# Patient Record
Sex: MALE | Race: WHITE | NOT HISPANIC OR LATINO | Employment: FULL TIME | URBAN - METROPOLITAN AREA
[De-identification: names, ages, dates, MRNs, and addresses within clinical notes are randomized per-mention and may not be internally consistent; named-entity substitution may affect disease eponyms.]

---

## 2019-01-16 ENCOUNTER — TRANSFERRED RECORDS (OUTPATIENT)
Dept: HEALTH INFORMATION MANAGEMENT | Facility: CLINIC | Age: 19
End: 2019-01-16

## 2019-02-15 ENCOUNTER — TELEPHONE (OUTPATIENT)
Dept: HEMATOLOGY | Facility: CLINIC | Age: 19
End: 2019-02-15

## 2019-02-15 NOTE — TELEPHONE ENCOUNTER
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      I reviewed our location and the recommended parking options. He had received our packet of instructions.    I did outline the plan for the visit and the providers that he would see.  I told him that I anticipate that the visit would take ~ 3 hours.    I presented the details of the [employer] registry, which he could participate in.  He said that he would like to participate.    I did ask him to provide any specific concerns that he wants to address at this visit.  He said he did not have any.    He plans to attend the visit as scheduled.  I wished him well and told him that we looked forward to seeing him at his visit. I told him that Caitlyn would be the nurse seeing him that day.    Nany Rahman, RN - Nurse Clinician - [facility] - [phone]

## 2019-02-18 ENCOUNTER — OFFICE VISIT (OUTPATIENT)
Dept: HEMATOLOGY | Facility: CLINIC | Age: 19
End: 2019-02-18

## 2019-02-18 ENCOUNTER — TRANSFERRED RECORDS (OUTPATIENT)
Dept: HEALTH INFORMATION MANAGEMENT | Facility: CLINIC | Age: 19
End: 2019-02-18

## 2019-02-18 ENCOUNTER — OFFICE VISIT (OUTPATIENT)
Dept: HEMATOLOGY | Facility: CLINIC | Age: 19
End: 2019-02-18
Attending: GENETIC COUNSELOR, MS
Payer: COMMERCIAL

## 2019-02-18 ENCOUNTER — OFFICE VISIT (OUTPATIENT)
Dept: HEMATOLOGY | Facility: CLINIC | Age: 19
End: 2019-02-18
Attending: INTERNAL MEDICINE
Payer: COMMERCIAL

## 2019-02-18 ENCOUNTER — HOSPITAL ENCOUNTER (OUTPATIENT)
Dept: PHYSICAL THERAPY | Facility: CLINIC | Age: 19
Setting detail: THERAPIES SERIES
End: 2019-02-18
Attending: PHYSICAL THERAPIST
Payer: COMMERCIAL

## 2019-02-18 VITALS
OXYGEN SATURATION: 98 % | SYSTOLIC BLOOD PRESSURE: 138 MMHG | HEART RATE: 62 BPM | DIASTOLIC BLOOD PRESSURE: 70 MMHG | WEIGHT: 168.6 LBS | BODY MASS INDEX: 23.6 KG/M2 | RESPIRATION RATE: 12 BRPM | TEMPERATURE: 97.7 F | HEIGHT: 71 IN

## 2019-02-18 DIAGNOSIS — D66 HEMOPHILIA A (H): Primary | ICD-10-CM

## 2019-02-18 DIAGNOSIS — R26.9 ABNORMAL GAIT: ICD-10-CM

## 2019-02-18 DIAGNOSIS — D66 HEMOPHILIC ARTHROPATHY (H): ICD-10-CM

## 2019-02-18 DIAGNOSIS — M36.2 HEMOPHILIC ARTHROPATHY (H): ICD-10-CM

## 2019-02-18 DIAGNOSIS — Z71.9 ENCOUNTER FOR COUNSELING: Primary | ICD-10-CM

## 2019-02-18 LAB
BETHESDA FOR FACTOR 8: 0.1
HCV AB SERPL QL IA: NEGATIVE
HIV 1+2 AB+HIV1 P24 AG SERPL QL IA: NORMAL

## 2019-02-18 PROCEDURE — 99213 OFFICE O/P EST LOW 20 MIN: CPT | Performed by: INTERNAL MEDICINE

## 2019-02-18 PROCEDURE — 85240 CLOT FACTOR VIII AHG 1 STAGE: CPT | Performed by: PHYSICIAN ASSISTANT

## 2019-02-18 PROCEDURE — 97161 PT EVAL LOW COMPLEX 20 MIN: CPT | Mod: GP | Performed by: PHYSICAL THERAPIST

## 2019-02-18 PROCEDURE — 97110 THERAPEUTIC EXERCISES: CPT | Mod: GP | Performed by: PHYSICAL THERAPIST

## 2019-02-18 PROCEDURE — 00000167 ZZHCL STATISTIC INR NC: Performed by: PHYSICIAN ASSISTANT

## 2019-02-18 PROCEDURE — 00000401 ZZHCL STATISTIC THROMBIN TIME NC: Performed by: PHYSICIAN ASSISTANT

## 2019-02-18 PROCEDURE — 00000328 ZZHCL STATISTIC PTT NC: Performed by: PHYSICIAN ASSISTANT

## 2019-02-18 RX ORDER — FEXOFENADINE HCL 60 MG/1
60 TABLET, FILM COATED ORAL PRN
COMMUNITY

## 2019-02-18 RX ORDER — ANTIHEMOPHILIC FACTOR (RECOMBINANT) 3000 (+/-)
3000 KIT INTRAVENOUS PRN
Refills: 0 | Status: ON HOLD | COMMUNITY
Start: 2018-08-17 | End: 2021-09-03

## 2019-02-18 ASSESSMENT — PAIN SCALES - GENERAL: PAINLEVEL: MILD PAIN (2)

## 2019-02-18 ASSESSMENT — MIFFLIN-ST. JEOR: SCORE: 1796.89

## 2019-02-18 NOTE — PATIENT INSTRUCTIONS
Reason for Visit:  Hemophilia Comprehensive Evaluation  Attended entire visit with Dr. Osei, CORY Calderón  Face To Face Time for Education (After provider visit): 20 minutes     Teaching Flowsheet   Clyde Becerra  has a diagnosis of HEMOPHILIA A  with a baseline factor  FACTOR VIII of   2% . He  presents today for his  comprehensive Hemophilia clinic evaluation.  Teaching Topic: Establish care with our center, update emergency treatment rec     Person(s) involved in teaching:   Patient     Motivation Level:  Asks Questions: Yes  Eager to Learn: Yes  Cooperative: Yes  Receptive (willing/able to accept information): Yes     Patient demonstrates understanding of the following:  Reason for the appointment, diagnosis and treatment plan: Yes  Knowledge of proper use of medications and conditions for which they are ordered (with special attention to potential side effects or drug interactions): Yes  Which situations necessitate calling provider and whom to contact: Yes      Nutritional needs and diet plan: NA  Pain management techniques: Yes  Wound Care: NA  How and/when to access community resources: Yes        Clyde Becerra   averages 0 -1 Bleeding Episodes per Month . Annual Bleed Rate (ABR)= 0-1.  He uses advate brand factor VIII concentrate and treats on demand.  He usually comes to clinic or hospital for infusion but has expressed desire to self infuse in future and his typical dose   Is 3000  Units (40 units/kg of Jivi).  He is encouraged to keep track of his infusions and was given information on ADVOY.       - Reviewed procedure for home infusions of factor concentrates.  See attached treatment recommendations.    - Reviewed severe/life threatening hemorrhage and handout given that recommends appropriate dosing.  If suspect bleeding in head, neck, throat or abdomen, give dose of factor if able, contact the hemophilia center immediately or report to the Emergency Room at [facility] or  the nearest emergency room.    - Reviewed standard precautions to prevent viral transmission in the home.    - Discussed importance of Medic Alert identification.  Medic Alert application given to patient.    - Discussed need for dental check-ups and prophylactic antibiotics for dental procedures.   - Patient exercises 1-2x/wk.  Activities he enjoys include: boot hockey with friends.  -Patient's primary provider is Dr. Sheets at [facility].    Discussed and gave copies of the following handouts with patient.  [facility] Contact Card    PHYSICAL THERAPY INSTRUCTIONS:    You were given instructions in calf and hamstring muscles today.  Try to do these at least a few times/week up to daily.  Hold all stretches for 30 seconds.  Make sure to walk around and be active after stretching.    You were instructed in sitting with your legs in a comfortable but not fully bent position when you are relaxing, playing video games, to keep your legs stretched out.    We talked about increasing your daily activity especially during the winter months by trying to find indoor activities.    You were given instructions in recognizing a new bleed into your joints.  If you ever have questions or concerns you should call your hemophilia team.    Contact Carol Ann physical therapist, with any joint, muscle or mobility questions or concerns.  Treatment of New Joint and/or Muscle Bleeds:     Treat with factor per doctor s orders.    Apply RICE treatment as needed for pain relief and to allow rest and healing   o R=Rest, Limit movement and protect your joint with splints, braces and assistive devices as directed by your treatment center.  Use crutches and do not put any weight on a new lower extremity bleed (ex: hip, knee, ankle).   Only move in pain-free range.  o I=Ice, Apply ice to surround the affected area for 15-20 minutes every 2 hours.  Use ice cubes in bag, frozen vegetables, gel ice  packs, or commercial products (Cryocuff ).  o C=Compression, Helps to control swelling and can decrease pain. Can use elastic wraps or compression sleeve such as tubigrip.  Avoid tourniquet effect by using proper technique. Remove if pain increases or with any numbness or tingling.   o E=Elevate, Can help decrease swelling and encourages rest.  Most effective with elevation above level of the heart.  Joint Health: Bleed prevention and regular activity are key elements to maintaining health joints. Limit activities that cause joint pain and spread out activities/exercises throughout the day and the week, alternate exercises each day.    Footwear: Always wear supportive footwear-shoes with laces and good ankle support are best.  If you experience frequent foot/ankle pain, try wearing shoes indoors as well as outdoors.    Cardiovascular activity recommendations (18 years and over): For health benefits, adults need at least 150 minutes (about 30 minutes, 5 days a week) of moderate intensity aerobic activity each week AND 2 or more days of muscle strengthening to include all major muscle groups.  Also include a general stretching program into your daily routine.  Try breaking up your aerobic activity into small chunks of time during the day,  10 minutes at a time.      Hailey Hall [facility]  Physical Therapist  [facility]  [phone]

## 2019-02-18 NOTE — PROGRESS NOTES
Comprehensive Hemophilia Clinic Visit   [facility]  [address] [place] [id number]  Phone: [phone], Fax: [phone]    Patient: Clyde Becerra  MRN: [id number]  : 2000  SLIM: 2019    Pertinent Hemophilia History:  Clyde is a 18 year old male with moderate hemophilia A (baseline levels reported as 1-2%) without history of inhibitor.  Here for his first adult hemophilia clinic visit.      Diagnosed at about 16 months of age when he was evaluated for bleeding from tongue.  There was no family history of hemophilia, no bleeding with his circumcision.  Some increased bruising following immunizations.  Born via elective  due to being large for gestational age. Following birth he had slow development and macrocephaly.  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  Diagnosed with ADHD 2015 by psychologist.     First joint bleed: 2000 - right ankle.  This was date of first treatment as well.     Had a DDAVP challenge in  which increased his factor VIII to 5%.    In 2008 he was started on factor prophylaxis for left knee pain and MRI which showed mild synovitis.  Due to significant needle phobia he ended up having a port a cath placed in 2009.  This was removed in 2014. He was diagnosed in  by orthopedics with Ignrkae-Ileodp-Oajkcovis syndrome of the left knee and prophylaxis was stopped as pain was not felt to be from bleeding.  Follow-up MRI of knee in 2011 showed stable mild synovitis of the left knee.  He reports no recurrent bleeding events into the knee.     Clyde has had very few bleeding events in the last few years and has not been on prophylactic factor.  He has been seen in clinic for all factor administrations.      He reports today concern for vomiting after alcohol consumption, he cannot predict when it will happen but always occurs the morning after drinking and it happens for many hours.  He  has noticed some blood in his vomit at times.  He has had no other bleeding issues (nosebleeds, gum bleeding, no blood in stool or urine).  He denies reflux symptoms and takes no other medications.     He feels he is otherwise healthy.  He does note significant anxiety and uses marijuana daily for this.     2. Treatment Regimen:     Prophylaxis (Y/N): No   Factor, dose, schedule: Has use Advate in the past     On Demand for bleeds:   Factor, dose: Advate 3000 units = 40 units/kg     Home therapy?:Has factor at home but ability to treat is minimal     Central Venous Access Device: No, he had a port 3951-2405    3. Previous joint/muscle disease, procedures, and other events:  Left knee with Uidhxge-Lzocik-Oxpiuyifs syndrome. Last evaluation in 2011.  Mild synovitis noted at that time.   No other joints of concern.    4.  Infectious complications   Hepatitis A vaccines: vaccinated    HIV Status: negative   HCV Status and treatment: negative   HBV Status/vaccinations: vaccinated     Evidence of cirrhosis or liver disease on imaging? (Y/N/unkown): no concern based on history     Visit History:  1. Total # bleeding events since last comprehensive hemophilia clinic visit requiring factor infusion: none, he has been treated for possible bleeds twice in the last year or so but neither we clearly bleeds on imaging.    2. Method of recalling bleeding events (memory, paper log, ruth etc): None    3.  Hemarthroses: None    4. Other Bleeds (GI,, Soft tissue etc) and spontaneous vs traumatic: None    5. Joint Disease and Pain:  Joint procedures (synovectory or joints replacement) since last comprehensive visit: None  Overall activity level:   Work/school, recreation, and self care is: unrestricted    Chronic pain?: No chronic pain  Prescribed opioids for chronic pain and taking how often: No chronic opioids    6.  Hospitalizations and Absenteeism   # ER visits since last comprehensive visit: None   Hospitalizations since last  "comprehensive visit (not surgery): None   How many days of work/school missed since last comprehensive visit: None    7. Health maintenance:   PCP: has a primary care provider and was seen in 1/2019 for vomiting issues.  Is on no other medications, specifically for anxiety or ADHD.    Medications:  No current outpatient medications on file.     No current facility-administered medications for this visit.        Allergies:  Allergies not on file    PmHx:  No past medical history on file.    Family History:  Please refer to Paulina Broosk [facility]'s note for details.  No known family history of hemophilia.    Social History:  Please see Nellie MARTINEZ's note for her evaluation and assessment.     ROS:  Complete ROS is negative, except as noted above.     Objective:  Vitals: /70 (BP Location: Right arm, Patient Position: Sitting, Cuff Size: Adult Regular)   Pulse 62   Temp 97.7  F (36.5  C) (Oral)   Resp 12   Ht 1.803 m (5' 11\")   Wt 76.5 kg (168 lb 9.6 oz)   SpO2 98%   BMI 23.51 kg/m    Exam:  General: No acute distress.   Ext: Please see Carol Ann ESPANA's note for joint evaluation.       Labs:  Factor VIII assay to document baseline  CDC registry labs    Imaging:  None ordered.  Reviewed MRI of left knee done 2011 which showed stable mild synovitis.      Assessment:  In summary, Clyde Becerra is a 18 year old year old man with moderate hemophilia A (baseline factor VIII documented as 1-2%) here for initial adult hemophilia clinic visit.  Overall Clyde is doing well from a joint bleeding perspective, joints are preserved and he has few bleeding episodes. His and our main concern is his frequent vomiting episodes after using alcohol.  Discussed at length how best to avoid this and the risk for bleeding this imposes.      Clyde is going on a trip to [place] with friends in the next month and we discussed a trial of extended half life factor VIII for prevention of bleeds for this visit.  He is interested in this " option.    Plan:  1. Trial of Jivi (pegylated factor VIII) was written for today (8 doses of 3000 units = 40 units/kg).  Plan for two doses (3/1 and 3/6) before travel to [place] on 3/7 with factor VIII level being drawn on 3/6 before second dose.  This is not ideal but given the short time frame, but will give us some information.  He will come to clinic for these doses as he is not comfortable with self-infusion.  He will bring doses of factor to [place] with him for emergency use.  Upon return we would plan to obtain further factor levels after use to determine his pharmacokinetics on Jivi.  2. Would like to see Clyde in 6 months for routine follow-up.  No PT needed for this visit.   3. Will continue to work with Clyde on his level of comfort with self infusion and also ordering factor etc.    4. Discussed concern with his alcohol use and asked him to call with any persistent blood in his vomit.     Carol Ann Hall MPT; Nellie Gonzáles LICSW; Paulina Brooks [facility]; Sagar Nguyen, pharmacist and,Caitlyn Zhang nurse clinician have all seen the patient independently, please refer to their notes for their evaluation and assessment.           Eboni Tamayo MPH, PA-C  [facility]  [facility]  [phone] main line  [phone] pager  [phone] fax      HEMATOLOGY STAFF    Patient seen and evaluated as part of a shared visit.  I have reviewed the clinical history, physical exam, relevant labs, and treatment plan with the TONI, as well as other members of the comprehensive hemophilia care team.      Key findings:    19 yo male with moderate hemophilia A (baseline FVIII 1-2%), here to establish care.  Previously followed at [facility].  Treats on demand due to non-severe bleeding phenotype, preserved joints, and severe needle phobia.  Issues addressed today included discussion of FVIII prophylaxis for upcoming trip to [place], and his episodes of emesis following alcohol  consumption.    Key management decisions:    1.  Counseled about the dangers of alcohol consumption and subsequent recurrent emesis.  Encouraged to minimize alcohol use, and seek medical care promptly for bloody emesis.  2.  We will initiate a trial of extended half-life FVIII (Jivi) to offer some protection for his upcoming trip to [place].  This particular drug selected due to it's superior PK profile compared to other [facility] FVIII products (recent data presented at [employer] congress).  Will obtain FVIII levels for PK testing as outlined above.  Although we do not plan to keep him on prophylaxis, this information will be helpful to guide treatment for future bleeding episodes, or invasive procedures.      Pete Osei MD  Associate Professor of Medicine  Division of Hematology, Oncology, and Transplantation  Director, [facility]

## 2019-02-18 NOTE — PROGRESS NOTES
[facility]  [address] [place] [address] [address], [place] [address]  Main: [phone], Fax: [phone]    Social Work Evaluation    Name:  Clyde Becerra  Age:   18 year old  :  2000    Presenting Information:  Patient is a patient diagnosed with moderate hemophilia A with baseline factor VIII level of ~2% who presented to clinic today to establish care at an adult center and for a comprehensive clinic evaluation.   Met with Clyde 1:1 to complete psychosocial evaluation.  Collaborated with AY[facility] at [facility].    Living Situation:   Clyde lives in a home in [place] with his mom and dad.  He has had a tense relationship with his parents over time, and has experienced periods of homelessness when they would not allow him to stay at home.  He stated for now, staying wit his parents is a stable option. He would like to get a place of his own, but does not have active plans to locate housing at this time.    Family/Social Support:  In addition to Clyde's parents Chad and Jonnathan, he has an older sister (age 23) who lives in [place].  He stated he has a small and stable friend group that he hangs out with regularly.    Functional Status: Independent with ADLs and IADLs, including active motor vehicle .    Current Community Services:  None reported.  Clyde was not sure if he is connected with  assistance.  Past Community Services: Not discussed today.    Chemical Dependency:    Clyde stated he occasionally uses a e-cigarette, drinks alcohol 1-2x/week (usually weekends) and will have anywhere from 1-10 drinks at a time, and uses marijuana daily.    He declined information on cutting down use.  He is aware of laws related to legal age of use.  He denied psychosocial impact related to use, and high-risk behaviors related to use (ie doesn't drive with friends who have been drinking, uses taxis to get to/from places).  He has experienced ongoing nausa/severe  "vomiting after nights of drinking for which he has seen his PCP and discussed with providers here.  He stated marijuana helps him with pain, sleep, and stress management.    Mental/Emotional Health:   The patient reported a history of the following mental health concerns and/or diagnoses: ADHD diagnosis and self-reported anxiety and anger-issues.    PHQ-2: 0.    Clyde openly shared his long history of anger issues, and how this has manifested for him and impacted his life. He feels he has taken big steps towards management, stating he often uses self-talk or distraction to calm his emotions.  Clyde stated he used to varela a lot more often then he does now, and losing money makes him very angry, so he attributes that to his less frequent anger outbursts.  He stated he has, at times, punched holes in the wall. He has not hurt another person when angry.    Clyde stated a few weeks ago he threw away some of his factor medication.  Having hemophilia makes him angry and he \"doesn't want to deal\" with being told all his life that he \"couldn't play hockey or snowboard.\"  Provided validation and support, and dicussed consequences that may occur as a result of unresolved grief or anger, and how it may present in unhealthy coping skills.  He has [facility] a counselor at [facility] and he does not wish to pursue counseling services again at this time.    On average, Clyde sleeps 3-4 hours a night.  He has several hours a day of screen time between [employer], TV and cell phone.  Discussed sleep hygiene.  He stated he recently downloaded the [employer] Evonne (an  stated on TV it helped him with sleep) and that he has tried the free insomnia exercises and he has found them helpful.    Pain Management Strategies:   Clyde states he uses self-talk, reframing, and marijuana to manage his pain.  He smokes at least once a day. He stated he will often remind himself that \"pain is temporary.\"    Abuse Concerns:  No concerns " indicated during today's visit.    Education:  Currently attending [employer] and enrolled in 8 credits this semester.  Hemophilia Scholarships:  None.  He stated his mom is financing his college so he didn't feel the need to apply.  Encouraged him to consider applying for next semester.  Office of Disability/Accomodations:  Not registered and he was encouraged to do so  School Attendance:  Clyde stated he has not missed any school related to his bleeding disorder    Employment: Clyde last worked in November 2018 at the [employer]. He had been employed there for 4 years in . He quit to pursue his college courses and is not working at this time.    Clyde has missed one day of work in the past year due to hemophilia.  He had an elbow bleed last summer and took a day to rest his elbow.    Financial/Income:  Between savings (he reported having one year of living expenses saved) and parental support, he did not have financial concerns today. He stated that he was taught budgeting skills in high school and that he does not need support in this area.    Health Insurance:  [employer] of [place], thorough his father.  Asked him what he knew about the [place] and he stated he is aware he can stay on his dad's plan until age 24. Encouraged him to continue to familiarize himself with insurance terms as eventually, he will have his own plan and would benefit from knowing how to manage his benefits/expenses.    Health Literacy/Adjustment to Illness:  Clyde openly shared that he is angry about having hemophilia. He is not interested in counseling support to assist with this.  He has stated he would like to learn self-infusion skills.  He did schedule his own appointment at our center, but his mom primarily orders factor. He stated he would like to gain independence in this area, too.  He treats with [facility] as needed, typically in-center.    Medicalert:  Clyde does wear a Medicalert bracelet.  He stated his account is up to  date.    Advanced Care Planning:  No [facility]    Authorization to discuss PHI:  Completed this date and sent to HIM for scanning.  Electronic Consent for email: Completed this date and sent to HIM for scanning.  Electronic Consent for text messaging:  Completed this date and sent to HIM for scanning.    Interventions Utilized:   Assessment of strengths and needs  Assessment of impact of bleeding disorder, overall adjustment, and current coping skills  Supportive Counseling  Explored aspects of family history and dynamics   Explored and processed emotional/social responses to bleeding disorder and treatment regime.  Normalized and validated feelings and experiences  Provided positive feedback and encouragement  Discussed Advanced Care Planning and completed ROIs  Discussed Applicable Community Resources  Discussed transition-related topics: insurance psychoeducation, ordering factor, budgeting  Case Coordination with [facility] team and [facility] .    Impressions/Recommendations:    Clyde Becerra is a 18 year old male who [facility] this date to establish adult hemophilia care and for his routine comprehensive clinic evaluation.  Clyde presented today as alert, oriented and engaged. They were receptive to social work visit.      Clyde is working through steps to attain independence as a young adult.  He has made his own appointments and would like to gain skills around self-infusion and ordering factor.    Clyde has a history of ADHD and self-reported anger issues and anxiety. He endorses anger and denial related to his hemophilia diagnosis but is not ready for support in processing this.     He uses alcohol regularly, and engages in binge drinking behavior. He is aware of legal and social consequences of consumption and does not endorse high risk behavior surrounding use. He was encouraged to abstain due to age and health impacts, especially given the  "vomiting he has been experiencing after drinking, which was discussed by providers.  He smokes marijuana daily which he states helps him with pain, sleep, and relaxation.  He declined resources for cutting back.    Clyde described a friend group he spends a lot of time with, and a tense relationship with his parents. He enjoys going to the family cabin in the summer, playing boot hockey and video games. He is studying his generals at [employer] and would like a career in [employer] where he can travel for work.  He is travelling to [place] on March 7th with friends for a Spring Break trip.    Psychosocial stressors were identified as family of origin issues, health issues, mental health symptoms and parent-child stress  Explored Clyde's goal(s) to include \"getting a clean bill of health\" today, given recent vomiting after drinking and upcoming trip.  Additionally, he would like to gain confidence in self-infusion and ordering factor, though he states that may be hard for his mom to give up.    Plan:   There were no further SW questions at the conclusion of today's visit.  Writer will remain available to assist with psychosocial needs or concerns as they arise.  Contact information was provided and the patient was encouraged to call as needed.    Nellie Gonzáles, ULICES, LICSW, ACM  Clinical   [facility]  Phone: [phone]    "

## 2019-02-18 NOTE — PROGRESS NOTES
[facility]  [address] [place] [address] [address], [place] [address]     Bleeding Disorder Emergency Treatment Recommendation     Date:2019  Name: Clyde Becerra  MRN: [id number]  : 2000  Diagnosis:  Moderate Hemophilia A  Baseline factor level:  Factor  VIII = 2 %  Inhibitor status:    History of inhibitor? No  Most Recent [place]: In process  Patient weight:   Wt Readings from Last 1 Encounters:   19 76.5 kg (168 lb 9.6 oz)       Current treatment:  On demand Jivi 40 units/kg (3000 units as of 19)  For bleeding events:      Treat with: recombinant factor VIII 50 units/kg     Factor replacement should be administered emergently and prior to imaging or any invasive/surgical procedure     For suspected intracranial hemorrhage or life / limb threatening bleed, draw a peak factor VIII level 5-10 minutes after administration     For mucosal bleeding, antifibrinolytics may be useful; these are contraindicated in hematuria  o Aminocaproic acid (Amicar) 50mg/kg IV or PO q 6 hours  o Tranexamic acid (Lysteda) 1300 mg po TID or 10mg/kg IV q 8 hours     A single dose of factor replacement will temporarily correct the bleeding disorder  Further dosing may be required    For guidance, please contact:  [facility]: Main: [phone], Fax: [phone]  Outside providers call Physician Access at:  [phone] (ask for hematologist on call)  : Dr. Pete Osei

## 2019-02-18 NOTE — PROGRESS NOTES
2/18/2019    Presenting Information: Clyde Becerra was seen for a comprehensive clinic visit at the [facility] today. I met with him to update his family history and to review the inheritance and genetics of hemophilia A.     Personal History:   Clyde is a 18 year old year old young man with moderate hemophilia A. Please see Eboni Tamayo PA-C s note for details regarding his medical history.     Family History: A three generation pedigree, specific to bleeding was updated today from a pedigree initially obtained in 1998 and scanned into the EMR. The following information is significant:     Clyde believes that his sister recently had genetic testing that was negative, and is not a hemophilia A carrier.     He is not sure if his mom had genetic testing.    No other family history of hemophilia.    No paternal family history of bleeding disorders.     Discussion:   We reviewed that mutations in the F8 gene that cause hemophilia are inherited in an X linked recessive pattern.  The F8 gene is located on the X chromosome.  Men with hemophilia will always pass on their X chromosome to each daughter and his Y chromosome to each son.  This means that all of Clyde's future daughters will be carriers of hemophilia A, but none of his sons will have hemophilia A, unless his partner is a carrier or there is a de shahid (new) mutation in one of his children.  We discussed that other family members may be carriers of hemophilia A.  He did know that carriers had a 50/50 chance for their sons to have hemophilia.     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 I also asked Clyde if he had participated in the My Life Our Future study, but he does not think this sounds familiar. He signed a release of information form so that we may obtain his genetics records [facility].     Plan:   1. A three generation pedigree was updated and scanned into the  EMR.  2. [facility] will [facility]  3. He will meet with other members of our team today. Please see their notes independently.   4. Contact information was provided today. Additional questions or concerns were denied.    Approximate Time Spent in Consultation: 10 minutes.     Paulina Brooks MS, [facility]  Licensed Genetic Counselor  P. [phone]  F. [phone]    CC: No Letter     4/22/2019    [facility]: genetic counseling notes, pedigree, genetic test report. 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     Paulina Brooks MS, [facility]  Licensed Genetic Counselor  P. [phone]  F. [phone]

## 2019-02-19 ENCOUNTER — DOCUMENTATION ONLY (OUTPATIENT)
Dept: HEMATOLOGY | Facility: CLINIC | Age: 19
End: 2019-02-19

## 2019-02-19 LAB — FACT VIII ACT/NOR PPP: 2 % (ref 55–200)

## 2019-02-19 NOTE — PROGRESS NOTES
OUTPATIENT PHYSICAL THERAPY HEMOPHILIA CLINIC NOTE  [facility]    Clyde Becerra     YOB: 2000  [id number]    Reason for visit: Comprehensive Clinic  Date of service:  2/18/19  Referring provider: Eboni Tamayo PA-C  Medical Diagnosis: Factor VIII -  Moderate  Replacement therapy: On Demand    Interval History (include personal factors and/or comorbidities that impact the plan of care):   Clyde is new to our clinic and is here for his transition to adult care visit.  Today he reports no specific joint or muscle concerns but he does report that he has very tight hamstrings and he notices knee stiffness because of this.  Bleeds: None reported in the past year, he does not keep written records  Work/school: Clyde is currently taking classes at [employer] with interest in hospitality.  He is not currently employed.    Exercise/physical activity: Clyde reports that he really enjoys being of the water with friends, fishing and some water sports but he has not found similar interests during winter months.  He did play a little basketball and boot hockey but has not done this for about 2 months.  He activity level is fairly sedentary.       Orthopedic past medical history: None per patient    Pain:  Chief complaint: Denied joint and muscle pain    Fall Risk Screen:  Has the patient fallen 2 or more times in the last year? No  Has the patient fallen and had an injury in the past year? No  Timed Up and Go Score: NA  Is the patient a fall risk? No    Physical Exam:   ROM: WFL for age  Swelling: Right knee appears visually larger than left, palpation reveals very mild swelling.  Clyde states his right knee has had this appearance for many years and may be related to baseball sliding on this knee.   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  He is able to tolerate sitting for extended periods of time but does notice tightness in his knees upon standing.   Bleeds: None reported.  Activity: Clyde has been very sedentary for the past 2-3 months per his report.  He does not have a regular exercise program.  Ultrasound: assessment completed of bilateral knees, suprapatellar LAX and SAX, medial and lateral condyle views and infrapatellar LAX all completed.  No identified areas of concern.  Joint concerns: None at this time. 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  This was re-evaluated in 2011 and found to be resolved.  He denies ongoing concerns in the left knee.   Educational needs: Clyde denies ability to recognize acute bleeding and does not verbalize s/s.  Clyde states he has HEP to stretch his hamstrings but is unable to demonstrate these stretches today.  Clyde spends a lot of time in sitting both at school and playing video games, he is not able to demonstrate postural corrections or options.    Findings affecting muscle length, posture and general knowledge on joint health.     Treatment diagnosis: Limited knowledge of condition and / or self care - inability to control symptoms, Impaired posture / muscle imbalance, Muscle flexibility limitation    Clinical Presentation: Stable/Uncomplicated  Clinical Presentation Rationale: Expected presentation for age  Clinical Decision Making (Complexity): Low complexity  Treatment: Met with patient 1:1 today to update musculoskeletal and mobility history and provide education for home management of bleeding disorder.  Clyde is appropriate for hemophilia specific skilled PT services today to address home management of his bleeding disorder.  Treatment included:     Performed and provided verbal and written instruction in  posterior leg muscle stretches, provided variety of options to alternate stretches.  Instructed to complete minimum of every other day with goal of daily stretching.  Instructed to remain upright and active immediately following completion of stretching program.      Provided education on how positioning and posture can affect muscle length.  Encouraged sitting with knees are extended as possible when he is playing video games or other prolonged seated activity.      Educated on importance of regular activity, and discussed his interest in basketball, boot hockey, and water sports.  Encouraged Clyde to continue to pursue options for activity during the winter.      Provided verbal and written education about s/s acute bleeding and treatment interventions including RICE.  Encouraged Clyde to contact our team with any new joint/muscle concerns so we can address immediately and assist him with treatment planning.    Plan of Care:   1. Clyde to incorporate new stretches and activities per above.  2. Clyde should contact our clinic with any bleeding related concerns.  3. Will plan to see at next clinic visit and be available for questions in the interim.    Therapy Frequency and Predicted Duration of Therapy Intervention: One session evaluation and treatment  Goals: Patient verbalizes understanding of evaluation results, home management and home exercise recommendations provided today to maximize functional mobility and allow for continued safe participation in current home and work activities. -Goal Met    Recommendations: Follow up at next scheduled [facility] clinic visit  Educational assessment/Barriers to learning: No barriers noted  Treatment provided this date (including minutes): Therapeutic Procedure: 12   Patient/Family Education:Early and adequate treatment principles, Signs and symptoms of a bleed, Adjunctive treatment/RICE, General information on benefits of exercise and physical activity, Hemophilic arthropathy and  Home exercise program: Written and verbal instruction in the following: see above.   Risks and benefits of evaluation/treatment have been explained.  Patient, family and/or caregiver are in agreement with Plan of Care.    Timed Code Treatment Minutes: 12  Total Treatment Time (sum of timed and untimed services): 42    Signature: Hailey Hall [facility]  Physical Therapist  [facility]  [phone]    Date: 2/19/2019

## 2019-02-20 NOTE — PROGRESS NOTES
I spoke to [facility] program. They approved the free trial of Jivi for Clyde. 6 doses will be delivered to our pharmacy on Thursday 2/21/19.

## 2019-03-01 ENCOUNTER — ALLIED HEALTH/NURSE VISIT (OUTPATIENT)
Dept: HEMATOLOGY | Facility: CLINIC | Age: 19
End: 2019-03-01
Payer: COMMERCIAL

## 2019-03-01 DIAGNOSIS — D66 HEMOPHILIA A (H): Primary | ICD-10-CM

## 2019-03-01 PROCEDURE — 40000269 ZZH STATISTIC NO CHARGE FACILITY FEE

## 2019-03-01 NOTE — NURSING NOTE
"Clyde Becerra  MRN: [id number]  Hemophilia A=2%    Patient scheduled to come into clinic to review & practice mixing factor & venipuncture.  Reviewed mixing & patient demonstrated mixing of factor.  Reviewed venipuncture technique.  Patient poked himself briefly and then pulled butterfly out and said \" he didn't realized he was going to have to poke himself\".  He agreed to the infusion of Jivi 3119 units, and nurse assisted patient with venipuncture into left antecubital vein.  Mary Rahman, RN, MSN -Nurse Clinician, [facility] [phone]  "

## 2019-03-06 ENCOUNTER — ALLIED HEALTH/NURSE VISIT (OUTPATIENT)
Dept: HEMATOLOGY | Facility: CLINIC | Age: 19
End: 2019-03-06
Payer: COMMERCIAL

## 2019-03-06 DIAGNOSIS — D66 HEMOPHILIA A (H): Primary | ICD-10-CM

## 2019-05-30 ENCOUNTER — DOCUMENTATION ONLY (OUTPATIENT)
Dept: HEMATOLOGY | Facility: CLINIC | Age: 19
End: 2019-05-30

## 2019-05-30 NOTE — PROGRESS NOTES
Community Counts! - [facility] Public Health Surveillance Results    Clyde Becerra,2000, was notified of results of the above surveillance project.  Results included HIV, Hep C, and Inhibitor testing which were all negative. See letter section for details.    Caitlyn Zhang RN - Nurse Clinician - [facility] - [phone]

## 2019-07-03 ENCOUNTER — DOCUMENTATION ONLY (OUTPATIENT)
Dept: HEMATOLOGY | Facility: CLINIC | Age: 19
End: 2019-07-03

## 2019-07-03 NOTE — PROGRESS NOTES
Clyde Becerra participated in Community Counts! - [facility] Public Health Surveillance for Bleeding Disorders on February 18, 2019.    The project was explained and the authorization form was reviewed in detail with the patient.   The authorization form was signed and a copy was given to the patient.  The original will be stored in a secure  binder in the offices of the [facility].    Patient information was obtained from the patient and through chart abstraction to complete the surveillance questionnaires.     Patient sent to the lab for blood draw per protocol. Laboratory results will be mailed to the patient when available.     Patient has the appropriate numbers to call about this project should questions arise.       Alie Lucero  Data Registrar  [facility]  Phone: [phone]

## 2019-07-03 NOTE — PROGRESS NOTES
Community Counts! - CDC Public Health Surveillance Results  Clyde Becerra,2000, Labs from 02/18/2019 have returned from CDC Community Counts Registry. Results included:   HIV: Negative   Hepatitis C: Negative   Factor VIII Inhibitor: 0.1 [place] units  Labs were drawn at the [facility] and sent to the CDC Surveillance Laboratory to result. See media tab for scanned document. Original copy in secure binder.     Caitlyn Zhang RN - Nurse Clinician - [facility] - [phone]

## 2020-04-23 ENCOUNTER — TELEPHONE (OUTPATIENT)
Dept: HEMATOLOGY | Facility: CLINIC | Age: 20
End: 2020-04-23

## 2020-04-23 NOTE — TELEPHONE ENCOUNTER
Called patient to get scheduled for video comp clinic. Unable to leave VM will reach out via text to attempt scheduling.

## 2020-06-17 ENCOUNTER — TELEPHONE (OUTPATIENT)
Dept: HEMATOLOGY | Facility: CLINIC | Age: 20
End: 2020-06-17

## 2020-07-20 DIAGNOSIS — R26.9 ABNORMAL GAIT: ICD-10-CM

## 2020-07-20 DIAGNOSIS — D66 HEMOPHILIA A (H): Primary | ICD-10-CM

## 2020-07-21 ENCOUNTER — TELEPHONE (OUTPATIENT)
Dept: HEMATOLOGY | Facility: CLINIC | Age: 20
End: 2020-07-21

## 2020-07-21 NOTE — TELEPHONE ENCOUNTER
7/21/2020    Spoke to patient briefly to confirm appointment tomorrow.  He states he cancelled this appointmet a while ago and does not want to deal with his hemophilia.  He did report issues with his ankle but states he will go to regular doctor for this.  Tried to continue conversation but he ended the conversation stating he had to go.    Appointment for 7/22/2020 cancelled.  Relayed information to , Nellie Gonzáles who will reach out to patient in the future.         Eboni Tamayo MPH, PA-C  [facility]  [facility]  [phone] main line  [phone] pager  [phone] fax

## 2020-07-31 ENCOUNTER — TELEPHONE (OUTPATIENT)
Dept: HEMATOLOGY | Facility: CLINIC | Age: 20
End: 2020-07-31

## 2020-07-31 NOTE — TELEPHONE ENCOUNTER
[facility]  Social Work Note    Clyde Becerra is a 20 year old male with moderate hemophilia A with baseline factor VIII level of ~2% who was scheduled for a comprehensive clinic evaluation last week, and spoke to provider Eboni Tamayo PA-C about how he does not want to deal with his hemophilia before abruptly ending the call.    Writer outreached to Clyde today.  Voicemail is full.  Follow-up text sent.    ULICES Mendoza, LICSW, ACM  Clinical   [facility]  Phone: [phone]

## 2021-07-05 ENCOUNTER — TELEPHONE (OUTPATIENT)
Dept: HEMATOLOGY | Facility: CLINIC | Age: 21
End: 2021-07-05

## 2021-07-05 NOTE — TELEPHONE ENCOUNTER
RN received a voicemail from Clyde (7.3.21). Per Clyde he would like to discuss hemophilia treatment options.     RN spoke with Eboni Tamayo PA-C who stated we need to see Clyde before prescribing any medication. He was last seen in clinic in February 2019. RN called Clyde back on 7.5.21, he did not answer. His voicemail was full and RN was unable to leave a message.     RN will send a follow up text asking for call back to schedule.     RODNEY Cancino, RN, PHN - Nurse Clinician - [facility] - [phone]    Follow up call and text message attempted. No contact made. Will await return call from patient.     RODNEY Cancino, RN, PHN - Nurse Clinician - [facility] - [phone]

## 2021-09-01 ENCOUNTER — TRANSFERRED RECORDS (OUTPATIENT)
Dept: HEALTH INFORMATION MANAGEMENT | Facility: CLINIC | Age: 21
End: 2021-09-01

## 2021-09-01 LAB
CREATININE (EXTERNAL): 0.91 MG/DL (ref 0.72–1.25)
GFR ESTIMATED (EXTERNAL): >60 ML/MIN/1.73M2
GFR ESTIMATED (IF AFRICAN AMERICAN) (EXTERNAL): >60 ML/MIN/1.73M2
GLUCOSE (EXTERNAL): 103 MG/DL (ref 65–100)
POTASSIUM (EXTERNAL): 4 MMOL/L (ref 3.5–5)

## 2021-09-02 LAB
CREATININE (EXTERNAL): 0.72 MG/DL (ref 0.72–1.25)
GFR ESTIMATED (EXTERNAL): >60 ML/MIN/1.73M2
GFR ESTIMATED (IF AFRICAN AMERICAN) (EXTERNAL): >60 ML/MIN/1.73M2
GLUCOSE (EXTERNAL): 101 MG/DL (ref 65–100)
POTASSIUM (EXTERNAL): 3.3 MMOL/L (ref 3.5–5)

## 2021-09-02 PROCEDURE — 99207 PR NO CHARGE LOS: CPT | Performed by: INTERNAL MEDICINE

## 2021-09-02 NOTE — PROGRESS NOTES
[facility]  Transfer Triage Note    Date of call: 09/02/21  Time of call: 10:52 AM    Is pandemic COVID-19 a concern? NO    Reason for transfer: Patient has established care here   Diagnosis: Left neck possible abscess requiring factor VIII administration and frequent monitoring ashvin-Incision and drainage given known diagnosis of hemophilia A based on recommendations of Dr. Dan from hematology    Outside Records: Available  Additional records requested to be faxed to [phone].    Stability of Patient: Patient is vitally stable, with no critical labs, and will likely remain stable throughout the transfer process  ICU: No    Expected Time of Arrival for Transfer: greater than 24 hours    Arrival Location:  [address] [address] Phone: [phone]    Recommendations for Management and Stabilization: Not needed    Additional Comments   21-year-old male with left neck possible abscess requiring factor VIII administration and frequent monitoring ashvin-Incision and drainage given known diagnosis of hemophilia A based on recommendations of Dr. Dan from hematology.  The patient presented to [facility] emergency department on 9/1/2021 at 9 PM with left side of the face and neck pain, swelling, and erythema.  The patient has history of hemophilia A for which he follows distantly with hematology service at [facility].      The patient was diagnosed with left-sided head and neck abscess.  The patient was not found to have any evidence of sepsis with a heart rate of 59, respiratory rate of 18, blood pressure 137/80, temperature of 37.2, and normal white blood cell count.  The patient was initiated on intravenous antibiotics with Zosyn.  ENT was consulted who were ready to do an incision and drainage for this patient, however given the patient history of hemophilia A hematology was consulted.  Hematology service  recommended administering factor VIII and maintaining frequent monitoring of factor VIII every 12-24 hours targeting level of 50 to 60% of factor VIII 4 4 days perioperatively.  However and unfortunately, in [facility], factor VIII as a lab work takes a turnaround time of 3 to 4 days.  As such, the hematologist on service in [facility] contacted Dr. Dan the hematologist on service at the [facility] recommended a transfer to [facility] at the setting.    Accepted the patient after discussion with Dr. Ness the ENT surgeon on-call who recommended continued clinical monitoring for the patient while on intravenous antibiotics to ensure that the patient continues to need a surgical intervention especially in the setting that the patient would need at least 24 hours of waiting for a medical surgical bed prior to transfer to [facility] given the current bed status.      Bridget Simmons MD

## 2021-09-03 ENCOUNTER — HOSPITAL ENCOUNTER (INPATIENT)
Facility: CLINIC | Age: 21
LOS: 1 days | Discharge: LEFT BEFORE DISCHARGE | DRG: 871 | End: 2021-09-04
Attending: INTERNAL MEDICINE | Admitting: INTERNAL MEDICINE
Payer: COMMERCIAL

## 2021-09-03 ENCOUNTER — TELEPHONE (OUTPATIENT)
Dept: HEMATOLOGY | Facility: CLINIC | Age: 21
End: 2021-09-03

## 2021-09-03 VITALS
HEART RATE: 61 BPM | DIASTOLIC BLOOD PRESSURE: 73 MMHG | WEIGHT: 167.55 LBS | OXYGEN SATURATION: 97 % | TEMPERATURE: 97.5 F | SYSTOLIC BLOOD PRESSURE: 117 MMHG | BODY MASS INDEX: 23.37 KG/M2 | RESPIRATION RATE: 18 BRPM

## 2021-09-03 DIAGNOSIS — J36 PERITONSILLAR ABSCESS: Primary | ICD-10-CM

## 2021-09-03 LAB
ABO/RH(D): NORMAL
ALBUMIN SERPL-MCNC: 3.1 G/DL (ref 3.4–5)
ALP SERPL-CCNC: 78 U/L (ref 40–150)
ALT SERPL W P-5'-P-CCNC: 19 U/L (ref 0–70)
ANION GAP SERPL CALCULATED.3IONS-SCNC: 4 MMOL/L (ref 3–14)
ANTIBODY SCREEN: NEGATIVE
AST SERPL W P-5'-P-CCNC: 6 U/L (ref 0–45)
BASOPHILS # BLD AUTO: 0.1 10E3/UL (ref 0–0.2)
BASOPHILS NFR BLD AUTO: 0 %
BILIRUB SERPL-MCNC: 0.5 MG/DL (ref 0.2–1.3)
BUN SERPL-MCNC: 6 MG/DL (ref 7–30)
CALCIUM SERPL-MCNC: 9.2 MG/DL (ref 8.5–10.1)
CHLORIDE BLD-SCNC: 105 MMOL/L (ref 94–109)
CO2 SERPL-SCNC: 27 MMOL/L (ref 20–32)
CREAT SERPL-MCNC: 0.78 MG/DL (ref 0.66–1.25)
CREATININE (EXTERNAL): 0.79 MG/DL (ref 0.72–1.25)
EOSINOPHIL # BLD AUTO: 0.3 10E3/UL (ref 0–0.7)
EOSINOPHIL NFR BLD AUTO: 2 %
ERYTHROCYTE [DISTWIDTH] IN BLOOD BY AUTOMATED COUNT: 11.7 % (ref 10–15)
FACT VIII ACT/NOR PPP: 103 % (ref 55–200)
FACT VIII ACT/NOR PPP: 218 % (ref 55–200)
GFR ESTIMATED (EXTERNAL): >60 ML/MIN/1.73M2
GFR ESTIMATED (IF AFRICAN AMERICAN) (EXTERNAL): >60 ML/MIN/1.73M2
GFR SERPL CREATININE-BSD FRML MDRD: >90 ML/MIN/1.73M2
GLUCOSE (EXTERNAL): 106 MG/DL (ref 65–100)
GLUCOSE BLD-MCNC: 89 MG/DL (ref 70–99)
HCT VFR BLD AUTO: 41.2 % (ref 40–53)
HGB BLD-MCNC: 14.7 G/DL (ref 13.3–17.7)
IMM GRANULOCYTES # BLD: 0.1 10E3/UL
IMM GRANULOCYTES NFR BLD: 1 %
LYMPHOCYTES # BLD AUTO: 3.2 10E3/UL (ref 0.8–5.3)
LYMPHOCYTES NFR BLD AUTO: 22 %
MCH RBC QN AUTO: 29.1 PG (ref 26.5–33)
MCHC RBC AUTO-ENTMCNC: 35.7 G/DL (ref 31.5–36.5)
MCV RBC AUTO: 82 FL (ref 78–100)
MONOCYTES # BLD AUTO: 1.7 10E3/UL (ref 0–1.3)
MONOCYTES NFR BLD AUTO: 11 %
NEUTROPHILS # BLD AUTO: 9.5 10E3/UL (ref 1.6–8.3)
NEUTROPHILS NFR BLD AUTO: 64 %
NRBC # BLD AUTO: 0 10E3/UL
NRBC BLD AUTO-RTO: 0 /100
PLATELET # BLD AUTO: 292 10E3/UL (ref 150–450)
POTASSIUM (EXTERNAL): 3.1 MMOL/L (ref 3.5–5)
POTASSIUM BLD-SCNC: 4.1 MMOL/L (ref 3.4–5.3)
PROT SERPL-MCNC: 7.3 G/DL (ref 6.8–8.8)
RBC # BLD AUTO: 5.05 10E6/UL (ref 4.4–5.9)
SODIUM SERPL-SCNC: 136 MMOL/L (ref 133–144)
SPECIMEN EXPIRATION DATE: NORMAL
WBC # BLD AUTO: 14.8 10E3/UL (ref 4–11)

## 2021-09-03 PROCEDURE — 85240 CLOT FACTOR VIII AHG 1 STAGE: CPT | Performed by: PHYSICIAN ASSISTANT

## 2021-09-03 PROCEDURE — 99207 PR APP CREDIT; MD BILLING SHARED VISIT: CPT | Performed by: PHYSICIAN ASSISTANT

## 2021-09-03 PROCEDURE — 85025 COMPLETE CBC W/AUTO DIFF WBC: CPT | Performed by: PHYSICIAN ASSISTANT

## 2021-09-03 PROCEDURE — 36415 COLL VENOUS BLD VENIPUNCTURE: CPT | Performed by: PHYSICIAN ASSISTANT

## 2021-09-03 PROCEDURE — 999N000128 HC STATISTIC PERIPHERAL IV START W/O US GUIDANCE

## 2021-09-03 PROCEDURE — 30233V1 TRANSFUSION OF NONAUTOLOGOUS ANTIHEMOPHILIC FACTORS INTO PERIPHERAL VEIN, PERCUTANEOUS APPROACH: ICD-10-PCS | Performed by: INTERNAL MEDICINE

## 2021-09-03 PROCEDURE — 120N000002 HC R&B MED SURG/OB UMMC

## 2021-09-03 PROCEDURE — 250N000011 HC RX IP 250 OP 636: Performed by: PHYSICIAN ASSISTANT

## 2021-09-03 PROCEDURE — 99222 1ST HOSP IP/OBS MODERATE 55: CPT | Mod: AI | Performed by: INTERNAL MEDICINE

## 2021-09-03 PROCEDURE — 250N000015 HC RX FACTOR IP MED 636 OP 636: Performed by: PEDIATRICS

## 2021-09-03 PROCEDURE — 86900 BLOOD TYPING SEROLOGIC ABO: CPT | Performed by: INTERNAL MEDICINE

## 2021-09-03 PROCEDURE — 99222 1ST HOSP IP/OBS MODERATE 55: CPT | Mod: GC | Performed by: PEDIATRICS

## 2021-09-03 PROCEDURE — 85240 CLOT FACTOR VIII AHG 1 STAGE: CPT | Performed by: PEDIATRICS

## 2021-09-03 PROCEDURE — 80053 COMPREHEN METABOLIC PANEL: CPT | Performed by: PHYSICIAN ASSISTANT

## 2021-09-03 RX ORDER — ACETAMINOPHEN 325 MG/1
650 TABLET ORAL EVERY 4 HOURS PRN
Status: DISCONTINUED | OUTPATIENT
Start: 2021-09-03 | End: 2021-09-04 | Stop reason: HOSPADM

## 2021-09-03 RX ORDER — NALOXONE HYDROCHLORIDE 0.4 MG/ML
0.2 INJECTION, SOLUTION INTRAMUSCULAR; INTRAVENOUS; SUBCUTANEOUS
Status: DISCONTINUED | OUTPATIENT
Start: 2021-09-03 | End: 2021-09-04 | Stop reason: HOSPADM

## 2021-09-03 RX ORDER — NALOXONE HYDROCHLORIDE 0.4 MG/ML
0.4 INJECTION, SOLUTION INTRAMUSCULAR; INTRAVENOUS; SUBCUTANEOUS
Status: DISCONTINUED | OUTPATIENT
Start: 2021-09-03 | End: 2021-09-03

## 2021-09-03 RX ORDER — DEXAMETHASONE SODIUM PHOSPHATE 4 MG/ML
10 INJECTION, SOLUTION INTRA-ARTICULAR; INTRALESIONAL; INTRAMUSCULAR; INTRAVENOUS; SOFT TISSUE EVERY 8 HOURS
Status: DISCONTINUED | OUTPATIENT
Start: 2021-09-03 | End: 2021-09-04 | Stop reason: HOSPADM

## 2021-09-03 RX ORDER — NALOXONE HYDROCHLORIDE 0.4 MG/ML
0.2 INJECTION, SOLUTION INTRAMUSCULAR; INTRAVENOUS; SUBCUTANEOUS
Status: DISCONTINUED | OUTPATIENT
Start: 2021-09-03 | End: 2021-09-03

## 2021-09-03 RX ORDER — DIPHENHYDRAMINE HYDROCHLORIDE AND LIDOCAINE HYDROCHLORIDE AND ALUMINUM HYDROXIDE AND MAGNESIUM HYDRO
10 KIT EVERY 6 HOURS PRN
Status: DISCONTINUED | OUTPATIENT
Start: 2021-09-03 | End: 2021-09-04 | Stop reason: HOSPADM

## 2021-09-03 RX ORDER — LORAZEPAM 2 MG/ML
0.5 INJECTION INTRAMUSCULAR EVERY 4 HOURS PRN
Status: DISCONTINUED | OUTPATIENT
Start: 2021-09-03 | End: 2021-09-04 | Stop reason: HOSPADM

## 2021-09-03 RX ORDER — ANTIHEMOPHILIC FACTOR (RECOMBINANT) 3000 (+/-)
4128 KIT INTRAVENOUS ONCE
Status: COMPLETED | OUTPATIENT
Start: 2021-09-03 | End: 2021-09-03

## 2021-09-03 RX ORDER — HYDROMORPHONE HYDROCHLORIDE 1 MG/ML
.3-.5 INJECTION, SOLUTION INTRAMUSCULAR; INTRAVENOUS; SUBCUTANEOUS
Status: DISCONTINUED | OUTPATIENT
Start: 2021-09-03 | End: 2021-09-04 | Stop reason: HOSPADM

## 2021-09-03 RX ORDER — CLINDAMYCIN PHOSPHATE 600 MG/50ML
600 INJECTION, SOLUTION INTRAVENOUS EVERY 8 HOURS
Status: DISCONTINUED | OUTPATIENT
Start: 2021-09-03 | End: 2021-09-04 | Stop reason: HOSPADM

## 2021-09-03 RX ORDER — NALOXONE HYDROCHLORIDE 0.4 MG/ML
0.4 INJECTION, SOLUTION INTRAMUSCULAR; INTRAVENOUS; SUBCUTANEOUS
Status: DISCONTINUED | OUTPATIENT
Start: 2021-09-03 | End: 2021-09-04 | Stop reason: HOSPADM

## 2021-09-03 RX ORDER — ONDANSETRON 4 MG/1
4 TABLET, ORALLY DISINTEGRATING ORAL EVERY 6 HOURS PRN
Status: DISCONTINUED | OUTPATIENT
Start: 2021-09-03 | End: 2021-09-04 | Stop reason: HOSPADM

## 2021-09-03 RX ORDER — LIDOCAINE 40 MG/G
CREAM TOPICAL
Status: DISCONTINUED | OUTPATIENT
Start: 2021-09-03 | End: 2021-09-04 | Stop reason: HOSPADM

## 2021-09-03 RX ORDER — ONDANSETRON 2 MG/ML
4 INJECTION INTRAMUSCULAR; INTRAVENOUS EVERY 6 HOURS PRN
Status: DISCONTINUED | OUTPATIENT
Start: 2021-09-03 | End: 2021-09-04 | Stop reason: HOSPADM

## 2021-09-03 RX ORDER — HYDROMORPHONE HCL IN WATER/PF 6 MG/30 ML
.2-.3 PATIENT CONTROLLED ANALGESIA SYRINGE INTRAVENOUS
Status: DISCONTINUED | OUTPATIENT
Start: 2021-09-03 | End: 2021-09-03

## 2021-09-03 RX ADMIN — DEXAMETHASONE SODIUM PHOSPHATE 10 MG: 4 INJECTION, SOLUTION INTRA-ARTICULAR; INTRALESIONAL; INTRAMUSCULAR; INTRAVENOUS; SOFT TISSUE at 17:01

## 2021-09-03 RX ADMIN — HYDROMORPHONE HYDROCHLORIDE 0.2 MG: 0.2 INJECTION, SOLUTION INTRAMUSCULAR; INTRAVENOUS; SUBCUTANEOUS at 16:00

## 2021-09-03 RX ADMIN — ANTIHEMOPHILIC FACTOR (RECOMBINANT) 4128 UNITS: KIT at 17:14

## 2021-09-03 RX ADMIN — LORAZEPAM 0.5 MG: 2 INJECTION INTRAMUSCULAR; INTRAVENOUS at 21:39

## 2021-09-03 RX ADMIN — ANTIHEMOPHILIC FACTOR (RECOMBINANT) 300 UNITS/HR: KIT at 20:33

## 2021-09-03 RX ADMIN — CLINDAMYCIN IN 5 PERCENT DEXTROSE 600 MG: 12 INJECTION, SOLUTION INTRAVENOUS at 17:00

## 2021-09-03 ASSESSMENT — ACTIVITIES OF DAILY LIVING (ADL)
ADLS_ACUITY_SCORE: 19
ADLS_ACUITY_SCORE: 17

## 2021-09-03 NOTE — CONSULTS
Hematology Fellow Consult Note   Date of Service: 09/03/2021    Patient: Clyde Becerra  MRN: [id number]  Admission Date: 9/3/2021  Hospital Day: 0  Primary Outpatient Hematologist: Dr. 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     1. Right peritonsillar abscess  2. Moderate hemophilia A (baseline level 1-2%, no history of inhibitor)    Plan & Recommendations:   - Factor 8 level STAT  - Bolus of Kogenate 54 u/kg (4128u) ordered followed by infusion at 300 u/hr  - Repeat factor 8 after bolus  - Daily factor 8 level qAM  - OR planned for 7:30 AM, will need to check another factor 8 level in the PACU to see degree of consumption from procedure    Patient was seen and plan of care was discussed with attending physician Dr. Audie Rodriguez.  We will continue to follow this patient. Please don't hesitate to contact with questions.    Trudy Meadows MD   PGY4 Hematology/Oncology Fellow   Pager: [phone]      History of Present Illness:    Clyde Becerra is a 21 year old male who is transferred from an OSH for right peritonsillar abscess. There is a plan for surgical intervention by ENT tomorrow at 7:30AM. He has a history of hemophilia A and though he has moderate disease with infrequent visits or complications, his most recent visible factor 8 level was 2% in 2019. On my visit he was sleepy but awakes easily and expresses annoyance that surgery won't be today. He has pain and severe swelling of the right throat and mandible. He has not had any bleeding or bruising.     Hemophilia History: (Per Dr. Osei 2/8/19)  Clyde is a 18 year old male with moderate hemophilia A (baseline levels reported as 1-2%) without history of inhibitor.  Here for his first adult hemophilia clinic visit.       Diagnosed at about 16 months of age when he was  evaluated for bleeding from tongue.  There was no family history of hemophilia, no bleeding with his circumcision.  Some increased bruising following immunizations.  Born via elective  due to being large for gestational age. Following birth he had slow development and macrocephaly.  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  Diagnosed with ADHD 2015 by psychologist.      First joint bleed: 2000 - right ankle.  This was date of first treatment as well.      Had a DDAVP challenge in  which increased his factor VIII to 5%.     In 2008 he was started on factor prophylaxis for left knee pain and MRI which showed mild synovitis.  Due to significant needle phobia he ended up having a port a cath placed in 2009.  This was removed in 2014. He was diagnosed in  by orthopedics with Szijxab-Pxvozc-Shhnlhxtx syndrome of the left knee and prophylaxis was stopped as pain was not felt to be from bleeding.  Follow-up MRI of knee in 2011 showed stable mild synovitis of the left knee.  He reports no recurrent bleeding events into the knee.      Clyde has had very few bleeding events in the last few years and has not been on prophylactic factor.  He has been seen in clinic for all factor administrations.       He reports today concern for vomiting after alcohol consumption, he cannot predict when it will happen but always occurs the morning after drinking and it happens for many hours.  He has noticed some blood in his vomit at times.  He has had no other bleeding issues (nosebleeds, gum bleeding, no blood in stool or urine).  He denies reflux symptoms and takes no other medications.      He feels he is otherwise healthy.  He does note significant anxiety and uses marijuana daily for this.      2. Treatment Regimen:     Prophylaxis (Y/N): No              Factor, dose, schedule: Has use Advate in the past     On Demand for bleeds:              Factor, dose: Advate 3000 units = 40  units/kg     Home therapy?:Has factor at home but ability to treat is minimal     Central Venous Access Device: No, he had a port 5995-3076     3. Previous joint/muscle disease, procedures, and other events:  Left knee with Qrsmrlc-Tyeqzl-Vrljtvgbh syndrome. Last evaluation in 2011.  Mild synovitis noted at that time.   No other joints of concern.     4.  Infectious complications              Hepatitis A vaccines: vaccinated                      HIV Status: negative              HCV Status and treatment: negative              HBV Status/vaccinations: vaccinated                 Evidence of cirrhosis or liver disease on imaging? (Y/N/unkown): no concern based on history    Subjective    Review of Systems:  A comprehensive ROS was performed and found to be negative or non-contributory with the exception of that noted in the HPI above.    Past Medical History:   Diagnosis Date     Hemophilia A (H)      No past surgical history on file.  Social History     Socioeconomic History     Marital status: Single     Spouse name: Not on file     Number of children: Not on file     Years of education: Not on file     Highest education level: Not on file   Occupational History     Not on file   Tobacco Use     Smoking status: Not on file   Substance and Sexual Activity     Alcohol use: Not on file     Drug use: Not on file     Sexual activity: Not on file   Other Topics Concern     Not on file   Social History Narrative     Not on file     Social Determinants of Health     Financial Resource Strain:      Difficulty of Paying Living Expenses:    Food Insecurity:      Worried About Running Out of Food in the Last Year:      Ran Out of Food in the Last Year:    Transportation Needs:      Lack of Transportation (Medical):      Lack of Transportation (Non-Medical):    Physical Activity:      Days of Exercise per Week:      Minutes of Exercise per Session:    Stress:      Feeling of Stress :    Social Connections:      Frequency of  Communication with Friends and Family:      Frequency of Social Gatherings with Friends and Family:      Attends Sikh Services:      Active Member of Clubs or Organizations:      Attends Club or Organization Meetings:      Marital Status:    Intimate Partner Violence:      Fear of Current or Ex-Partner:      Emotionally Abused:      Physically Abused:      Sexually Abused:       No family history on file.  Medications Prior to Admission   Medication Sig Dispense Refill Last Dose     fexofenadine (ALLEGRA) 60 MG tablet Take 60 mg by mouth as needed   PRN     No current outpatient medications on file.     Objective   Physical Exam:    Blood pressure 108/69, pulse 62, temperature 98.1  F (36.7  C), temperature source Oral, resp. rate 15, weight 76 kg (167 lb 8.8 oz), SpO2 97 %.  Physical Exam  Constitutional:       General: He is not in acute distress.  HENT:      Head: Normocephalic and atraumatic.      Mouth/Throat:      Mouth: Mucous membranes are moist.   Eyes:      General: No scleral icterus.  Neck:      Comments: R>L neck and throat swelling with tenderness to palpation  Cardiovascular:      Rate and Rhythm: Normal rate and regular rhythm.   Pulmonary:      Effort: Pulmonary effort is normal. No respiratory distress.   Abdominal:      General: There is no distension.      Palpations: Abdomen is soft.   Skin:     General: Skin is warm and dry.   Neurological:      Mental Status: He is alert. Mental status is at baseline.         Labs & Studies: I personally reviewed the following studies:  ROUTINE LABS (Last four results):  CMP  Recent Labs   Lab 09/03/21  1537      POTASSIUM 4.1   CHLORIDE 105   CO2 27   ANIONGAP 4   GLC 89   BUN 6*   CR 0.78   GFRESTIMATED >90   KHOA 9.2   PROTTOTAL 7.3   ALBUMIN 3.1*   BILITOTAL 0.5   ALKPHOS 78   AST 6   ALT 19     CBC  Recent Labs   Lab 09/03/21  1537   WBC 14.8*   RBC 5.05   HGB 14.7   HCT 41.2   MCV 82   MCH 29.1   MCHC 35.7   RDW 11.7        INRNo lab  results found in last 7 days.    Imaging:  No orders to display

## 2021-09-03 NOTE — PLAN OF CARE
Arrived from:  OSH  Belongings: clothing,shoes, cell phone. Declined need for items to security.   Meds:  No meds brought to hospital.  2 RN Skin Assessment Completed by:  (still need obtain)  Non-intact findings documented (yes/no/NA):    Attending Physician/Team: Writer had trouble contacting attending physician/ or patient's team. Patient placement was contacted, and Tierra, at patient placement is working on contacting the medical team/physician assigned to this patient, and she stated that she will tell them to come see the patient on 6A.

## 2021-09-03 NOTE — PHARMACY-ADMISSION MEDICATION HISTORY
Admission Medication History Completed by Pharmacy    See [facility] Admission Navigator for allergy information, preferred outpatient pharmacy, prior to admission medications and immunization status.     Medication History Sources:     Patient    Changes made to PTA medication list (reason):    Added: None    Deleted: old factor VII orders - patient stated that he doesn't currently have any factor VIII med at home    Changed: None    Additional Information:    Patient was prescribed prednisone and viscous lidocaine on 8/31 for sore throat. He went to [facility] on 9/1, and received a dose of methylprednisolone in the ED there on 9/1, but steroid was not continued after that.    Prior to Admission medications    Medication Sig Last Dose Taking? Auth Provider   fexofenadine (ALLEGRA) 60 MG tablet Take 60 mg by mouth as needed PRN Yes Reported, Patient       Date completed: 09/03/21    Medication history completed by: Bhakti Jama, PharmD, BCPS  9/3/2021 3:59 PM

## 2021-09-03 NOTE — H&P
"[facility]    History and Physical - Hospitalist Service, [facility]       Date of Admission:  9/3/2021    Assessment & Plan      Clyde Becerra is a 21 year old male with a past medical history of hemophilia A, Lyme's disease, and mononucleosis who presents as transfer with a peritonsillar abscess. He is admitted to internal medicine for further treatment and monitoring.       Sepsis 2/2 Peritonsillar Abscess - Patient presented to [facility] ED with complaints of sore throat, odynophagia, and right neck swelling. He was found to have a peritonsillar abscess and was admitted for possible surgical drainage in setting of his hemophilia.  He was started on Unasyn, then switched to clindamycin due to his amoxicillin allergy.  Patient had progression of his symptoms requiring an overnight stay in the ICU over concerns of airway compromise. He received a dose of cefepime in addition to his clindamycin. He is tolerating his oral secretions but continues to have pain. WBC prior to transfer 18, currently 14.8  Most recent CT Head Neck 9/3/21 with \"interval increase in size of the right palatine tonsillar/peritonsillar abscess, currently measuring 3.0 x 3.0 x 3.5 cm, previously measuring 1.5 x 1.9 x 2.6 cm. There are increased inflammatory changes/edema extending inferiorly along the right pharyngeal mucosa with thickening of the right aryepiglottic fold and effacement of the right piriform sinus.\"   -ENT consulted, appreciate recs and assistance   -Clear liquid diet, NPO at midnight  -Start Decadron 10mg Q8H   -Continue clindamycin 600mg Q8H   -Continue dilaudid 0.3-0.5mg Q2H PRN for pain control     Hemophilia A - Patient's baseline factor VIII level ~2%.  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   -Hematology consulted, appreciate recs and assistance   -Per heme recs, give 50U/kg bolus of factor VIII, then obtain post dose factor VIII level.   -Then will start 300 U/hr gtt with " AM factor VIII levels          Diet:   Regular diet  DVT Prophylaxis: Pneumatic Compression Devices  Mares Catheter: Not present  Central Lines: None  Code Status:  Full Code Status    Clinically Significant Risk Factors Present on Admission                   Disposition Plan   Expected discharge: 09/06/2021 recommended to prior living arrangement once antibiotic plan established.     The patient's care was discussed with the Attending Physician, Dr. David Turner.    LEONEL Morales [facility]  Securely message with the Vocera Web Console (learn more here)  Text page via Qlue Paging/Directory  Please see sign in/sign out for up to date coverage information    ______________________________________________________________________    Chief Complaint   Right neck pain and swelling     History is obtained from the patient, patient's sister, and patient's chart    History of Present Illness   Clyde Becerra is a 21 year old male with a PMH as listed above who presents with a several day history of sore throat, painful swallowing, and neck swelling.  He was seen in urgent care who recommended him seek further care at the ED.  During his ED evaluation, he was found to have a peritonsillar abscess. He was admitted for likely surgical intervention by ENT with hematology assisting due to his history of hemophilia A. Patient had progression of his symptoms with trismus and inability to swallow his secretions. He was admitted overnight in the ICU to monitor his airway. He is now able to swallow his secretions but continues to have neck pain, sore throat, and ear pain.   Patient is anxious and frustrated that he is unable to have surgery tonight and will likely have a procedure tomorrow.   Sister at bedside notes he had some fevers prior to his admission.  He has been able to tolerate juice and water.     Review of Systems    The 10 point Review of Systems is negative other  than noted in the HPI or here.     Past Medical History    I have reviewed this patient's medical history and updated it with pertinent information if needed.   Past Medical History:   Diagnosis Date     Hemophilia A (H)        Past Surgical History   I have reviewed this patient's surgical history and updated it with pertinent information if needed.  No past surgical history on file.    Social History   I have reviewed this patient's social history and updated it with pertinent information if needed.  Social History     Tobacco Use     Smoking status: Not on file   Substance Use Topics     Alcohol use: Not on file     Drug use: Not on file       Family History     No significant family history    Prior to Admission Medications   Prior to Admission Medications   Prescriptions Last Dose Informant Patient Reported? Taking?   ADVATE 3000 units SOLR   Yes No   Sig: Inject 3,000 Units into the vein as needed   Antihemophilic Factor rAHF-PFM (ADVATE) 1500 units SOLR   Yes No   Sig: Inject 1,500 Units into the vein as needed   factor VIII, recomb-PEGylated-aucl, (JIVI) 3000 units SOLR   No No   Sig: Inject 3,000 Units into the vein twice a week Dose +/-10% IV push twice weekly.  Trial of medication, plan to obtain PK.   fexofenadine (ALLEGRA) 60 MG tablet   Yes No   Sig: Take 60 mg by mouth as needed      Facility-Administered Medications: None     Allergies   Allergies   Allergen Reactions     Amoxicillin Hives and Rash     might have also been from the virus itself  Hives        Nsaids Other (See Comments)     Pt has moderate hemophilia A       Physical Exam   Vital Signs: Temp: (!) 96.4  F (35.8  C) Temp src: Axillary BP: 124/78 Pulse: 75   Resp: 16 SpO2: 97 % O2 Device: None (Room air)    Weight: 167 lbs 8.79 oz    GENERAL: Alert and oriented x 3. NAD. Ambulatory. Cooperative.   HEENT: Anicteric sclera. Mucous membranes moist. Right neck with swelling.   CV: RRR. S1, S2. No murmurs appreciated.   RESPIRATORY: Effort  normal on RA. Lungs CTAB with no wheezing, rales, rhonchi.   GI: Abdomen soft and non distended with normoactive bowel sounds present in all quadrants. No tenderness, rebound, guarding. No lesions.   NEUROLOGICAL: No focal deficits. Moves all extremities.  CN 2-12 grossly intact.  EXTREMITIES: No peripheral edema. Intact bilateral pedal pulses.   SKIN: No jaundice. No rashes.        Data   Data reviewed today: I reviewed all medications, new labs and imaging results over the last 24 hours.   Results for CHRIS SHIRLEY (MRN [id number]) as of 9/3/2021 16:27   Ref. Range 9/3/2021 15:37   Sodium Latest Ref Range: 133 - 144 mmol/L 136   Potassium Latest Ref Range: 3.4 - 5.3 mmol/L 4.1   Chloride Latest Ref Range: 94 - 109 mmol/L 105   Carbon Dioxide Latest Ref Range: 20 - 32 mmol/L 27   Urea Nitrogen Latest Ref Range: 7 - 30 mg/dL 6 (L)   Creatinine Latest Ref Range: 0.66 - 1.25 mg/dL 0.78   GFR Estimate Latest Ref Range: >60 mL/min/1.73m2 >90   Calcium Latest Ref Range: 8.5 - 10.1 mg/dL 9.2   Anion Gap Latest Ref Range: 3 - 14 mmol/L 4   Albumin Latest Ref Range: 3.4 - 5.0 g/dL 3.1 (L)   Protein Total Latest Ref Range: 6.8 - 8.8 g/dL 7.3   Bilirubin Total Latest Ref Range: 0.2 - 1.3 mg/dL 0.5   Alkaline Phosphatase Latest Ref Range: 40 - 150 U/L 78   ALT Latest Ref Range: 0 - 70 U/L 19   AST Latest Ref Range: 0 - 45 U/L 6   Glucose Latest Ref Range: 70 - 99 mg/dL 89   WBC Latest Ref Range: 4.0 - 11.0 10e3/uL 14.8 (H)   Hemoglobin Latest Ref Range: 13.3 - 17.7 g/dL 14.7   Hematocrit Latest Ref Range: 40.0 - 53.0 % 41.2   Platelet Count Latest Ref Range: 150 - 450 10e3/uL 292   RBC Count Latest Ref Range: 4.40 - 5.90 10e6/uL 5.05   MCV Latest Ref Range: 78 - 100 fL 82   MCH Latest Ref Range: 26.5 - 33.0 pg 29.1   MCHC Latest Ref Range: 31.5 - 36.5 g/dL 35.7   RDW Latest Ref Range: 10.0 - 15.0 % 11.7   % Neutrophils Latest Units: % 64   % Lymphocytes Latest Units: % 22   % Monocytes Latest Units: % 11   % Eosinophils Latest  Units: % 2   Absolute Basophils Latest Ref Range: 0.0 - 0.2 10e3/uL 0.1   % Basophils Latest Units: % 0   Absolute Eosinophils Latest Ref Range: 0.0 - 0.7 10e3/uL 0.3   Absolute Immature Granulocytes Latest Ref Range: <=0.0 10e3/uL 0.1 (H)   Absolute Lymphocytes Latest Ref Range: 0.8 - 5.3 10e3/uL 3.2   Absolute Monocytes Latest Ref Range: 0.0 - 1.3 10e3/uL 1.7 (H)   % Immature Granulocytes Latest Units: % 1   Absolute Neutrophils Latest Ref Range: 1.6 - 8.3 10e3/uL 9.5 (H)   Absolute NRBCs Latest Units: 10e3/uL 0.0

## 2021-09-03 NOTE — PROGRESS NOTES
Quick Hematology Note (full consult to follow).    Discussed case with Dr. Osei.  Plan is to get stat FVIII, then give a 50 U/kg bolus dose (~3800 U).   Then we will get a post-dose FVIII     These have all been ordered.    After the second lab, we will want him to start on 300 U/hr gtt with AM FVIII levels. This has also been ordered.        Jan Rodriguez MD  Hematologist  Division of Hematology, Oncology, and Transplantation  [facility]  [address] [place]  Pager: [phone]

## 2021-09-03 NOTE — CONSULTS
Otolaryngology Consult Note  September 3, 2021      CC: Peritonsillar abscess    HPI: Clyde Becerra is a 21 year old male with a past medical history of hemophilia  who presented to an outside hospital with a 6 day history of sore throat and right-sided tonsillar swelling. He presented to [facility] 2 days ago and was determined to have a peritonsillar abscess. He received clindamycin while at the outside hospital (allergy to penicillins). He had increased swelling during the evening on 9/1 and was transferred to the ICU. He also received factor 8 with the plan of performing an I&D. He was transferred here for further management in the setting of his hemophilia and unimproved peritonsillar abscess.    Clyde is reporting some right sided ear pain. He has had difficulty drinking and eating, and today has only had a small amount of juice with his vitamin K which he took at 1pm.  He feels that his voice sounds muffled. Patient reports associated trismus, right-sided otalgia, odynophagia, change in voice quality and neck pain. He endorses having fevers and chills, and feels that his pain and associated symptoms are significantly worsening. ENT was consulted for help in management. He is not sure if he has had all of his childhood vaccines. He denies a history of many infection as a child, and has never had a peritonsillar abscess before.    PMH: hemophilia A    Surgical History: Port placement     Allergies   Allergen Reactions     Amoxicillin Hives and Rash     might have also been from the virus itself  Hives        Nsaids Other (See Comments)     Pt has moderate hemophilia A       Social History: patient lives in [place], his sister is with him today    ROS: 12 point review of systems is negative unless noted in HPI.    PHYSICAL EXAM:  General: sitting up in bed, no acute distress. Muffled voice.  There were no vitals taken for this visit.  HEAD: normocephalic, atraumatic  Face: symmetrical, CN VII intact  bilaterally, no swelling, edema, or erythema. Sensation V1-V3 intact and equal bilaterally.   Eyes: EOMI without spontaneous or gaze evoked nystagmus, PERRL, clear sclera  Ears: no tragal tenderness, external ear canal open and clear bilaterally  Nose: no anterior drainage  Mouth: moist, no ulcers, no jaw or tooth tenderness, tongue midline and symmetric. Floor of mouth is soft.  Oropharynx: Left-sided peritonsillar edema and erythema; tonsil within normal limits, fullness of the soft palate on the left with deviation of the uvula to the left, no oropharyngeal erythema  Respiratory: breathing comfortably on room air, no noisy breathing, no stridor. No drooling or tripoding. No accessory muscle use.  Neck: no LAD, trachea midline    IMAGING:  CT neck independently reviewed from OSH: There is a 2.7 cm x 2.7 cm simple abscess without loculations in the right peritonsillar space with edema into the right pyriform sinus.    Assessment and Plan  Clyde Becerra is a 21 year old male with a past medical history of hemophilia A who presents to the ED with a right-sided PTA after failing a course of antibiotics. He is not currently showing signs of airway compromise, and the management of his peritonsillar abscess is complicated by his hemophilia A which will need to be optimized prior to going to the operating room. We will plan on taking him to the operating room in the morning tomorrow. PTA drainage in the operating room will be necessary to ensure appropriate hemostasis during the procedure.    - OK have clear liquid diet now, NPO at midnight  - recommend 10Q8h of IV decadron  - 2 uPRBCs on call to the OR (ordered)  - defer to hematology regarding intraoperative management of hemophilia, appreciate their assistance  - continuous pulse ox monitoring at all times    Patient and plan discussed with Dr. Ze Palacios MD   Otolaryngology-Head & Neck Surgery PGY1  Please contact ENT with questions by dialing [phone]  [phone] and entering job code [phone] when prompted.

## 2021-09-04 ENCOUNTER — ANESTHESIA EVENT (OUTPATIENT)
Dept: SURGERY | Facility: CLINIC | Age: 21
DRG: 871 | End: 2021-09-04
Payer: COMMERCIAL

## 2021-09-04 ENCOUNTER — ANESTHESIA (OUTPATIENT)
Dept: SURGERY | Facility: CLINIC | Age: 21
DRG: 871 | End: 2021-09-04
Payer: COMMERCIAL

## 2021-09-04 LAB
BLD PROD TYP BPU: NORMAL
BLD PROD TYP BPU: NORMAL
BLOOD COMPONENT TYPE: NORMAL
BLOOD COMPONENT TYPE: NORMAL
CODING SYSTEM: NORMAL
CODING SYSTEM: NORMAL
CROSSMATCH: NORMAL
CROSSMATCH: NORMAL
FACT VIII ACT/NOR PPP: 163 % (ref 55–200)
HOLD SPECIMEN: NORMAL
HOLD SPECIMEN: NORMAL
UNIT ABO/RH: NORMAL
UNIT ABO/RH: NORMAL
UNIT NUMBER: NORMAL
UNIT NUMBER: NORMAL
UNIT STATUS: NORMAL
UNIT STATUS: NORMAL
UNIT TYPE ISBT: 5100
UNIT TYPE ISBT: 5100

## 2021-09-04 PROCEDURE — 99239 HOSP IP/OBS DSCHRG MGMT >30: CPT | Performed by: STUDENT IN AN ORGANIZED HEALTH CARE EDUCATION/TRAINING PROGRAM

## 2021-09-04 PROCEDURE — 86923 COMPATIBILITY TEST ELECTRIC: CPT | Performed by: INTERNAL MEDICINE

## 2021-09-04 PROCEDURE — 36415 COLL VENOUS BLD VENIPUNCTURE: CPT | Performed by: INTERNAL MEDICINE

## 2021-09-04 PROCEDURE — 85240 CLOT FACTOR VIII AHG 1 STAGE: CPT | Performed by: INTERNAL MEDICINE

## 2021-09-04 PROCEDURE — 250N000011 HC RX IP 250 OP 636: Performed by: PHYSICIAN ASSISTANT

## 2021-09-04 RX ORDER — METHYLPREDNISOLONE 4 MG/1
4 TABLET ORAL DAILY
Qty: 5 TABLET | Refills: 0 | Status: SHIPPED | OUTPATIENT
Start: 2021-09-04 | End: 2022-06-01

## 2021-09-04 RX ORDER — CLINDAMYCIN HCL 300 MG
600 CAPSULE ORAL 3 TIMES DAILY
Qty: 40 CAPSULE | Refills: 0 | Status: SHIPPED | OUTPATIENT
Start: 2021-09-04 | End: 2022-06-01

## 2021-09-04 RX ADMIN — DEXAMETHASONE SODIUM PHOSPHATE 10 MG: 4 INJECTION, SOLUTION INTRA-ARTICULAR; INTRALESIONAL; INTRAMUSCULAR; INTRAVENOUS; SOFT TISSUE at 02:02

## 2021-09-04 RX ADMIN — CLINDAMYCIN IN 5 PERCENT DEXTROSE 600 MG: 12 INJECTION, SOLUTION INTRAVENOUS at 02:01

## 2021-09-04 RX ADMIN — CLINDAMYCIN IN 5 PERCENT DEXTROSE 600 MG: 12 INJECTION, SOLUTION INTRAVENOUS at 09:21

## 2021-09-04 RX ADMIN — DEXAMETHASONE SODIUM PHOSPHATE 10 MG: 4 INJECTION, SOLUTION INTRA-ARTICULAR; INTRALESIONAL; INTRAMUSCULAR; INTRAVENOUS; SOFT TISSUE at 09:13

## 2021-09-04 ASSESSMENT — ACTIVITIES OF DAILY LIVING (ADL)
ADLS_ACUITY_SCORE: 17

## 2021-09-04 NOTE — PROGRESS NOTES
Otolaryngology Progress Note  September 4, 2021    S: No acute events overnight. Patient states that he is feeling better than last night and his pain has improved. He also endorses improvement of his voice.    O: /73 (BP Location: Right arm)   Pulse 61   Temp 97.5  F (36.4  C) (Temporal)   Resp 18   Wt 76 kg (167 lb 8.8 oz)   SpO2 97%   BMI 23.37 kg/m       General: A&O X 3, Laying comfortably in bed. Voice no longer muffled, much clearer compared to day prior.    Head: normocephalic, atraumatic  Mouth: moist, no ulcers, no jaw or tooth tenderness, tongue midline and symmetric.   Oropharynx: Right-sided peritonsillar swelling now with minor uvular deviation to the left, significantly improved from prior. Now able to visualize posterior oropharynx and contralateral tonsil.   Pulmonary: Non-labor breathing on room air, no stridor, no stertor, no accesory muscle use       LABS:  ROUTINE IP LABS (Last four results)  BMPRecent Labs   Lab 09/03/21  1537      POTASSIUM 4.1   CHLORIDE 105   KHOA 9.2   CO2 27   BUN 6*   CR 0.78   GLC 89     CBC  Recent Labs   Lab 09/03/21  1537   WBC 14.8*   RBC 5.05   HGB 14.7   HCT 41.2   MCV 82   MCH 29.1   MCHC 35.7   RDW 11.7        INRNo lab results found in last 7 days.    A/P: Clyde Becerra is a 21 year old male with a past medical history of hemophilia A who presents with a right-sided PTA after failing a course of antibiotics. He is not currently showing signs of airway compromise, and the management of his peritonsillar abscess is complicated by his hemophilia A so the initial plan was to take him to the operating room. However, his abscess has dramatically decreased in size and his symptoms have improved s/p clindamycin and dexamethasone regimen overnight. At this moment, the risks of OR drainage in the setting of hemophilia outweigh the benefits. New plan today was to observe him for one more evening and reassess symptoms and exam tomorrow and perform  surgery if indicated, however the patient became significantly frustrated with the situation and did not want to stay in the hospital, opting to leave despite our recommendations.    - advised the patient to stay in the hospital for monitoring of symptoms, however he preferred to leave  - if patient leaves, send him with clindamycin and a medrol dose-pack   - follow-up with Dr. Kunz in clinic on Tuesday 9/7 at 8AM to monitor for resolution of symptoms    -- Patient and above plan discussed with Dr. Kunz.    Ade Palacios MD  Otolaryngology-Head & Neck Surgery PGY1  Please contact ENT with questions by dialing [phone] and entering job code [phone] when prompted.

## 2021-09-04 NOTE — PLAN OF CARE
Plan of Care Note    Reason for Admission: Peritonsillar abscess  Procedures: INCISION AND DRAINAGE, ABSCESS, TONSILLAR OR PERITONSILLAR (to be performed today)  IV Access: R PIV (x2) (1 infusing, I saline locked)  Incisions/Drains: None  VS: /73 (BP Location: Right arm)   Pulse 61   Temp 97.5  F (36.4  C) (Temporal)   Resp 18   Wt 76 kg (167 lb 8.8 oz)   SpO2 97%   BMI 23.37 kg/m    Diet: NPO  Activity: Up ad pee  Pain Management: Denies  GI/: Voiding spontaneously   Neuro: A&Ox4  Team: Med Gold Night  Pertinent Labs: None      Shift Summary  Patient sleeping between cares. Continue with poc

## 2021-09-04 NOTE — PROGRESS NOTES
ORL HNS  Patient seen and examined at bedside.  This morning he is feeling much better  His speech is much better   No drooling  Decreased pain  On exam he no longer has trismus and the right palate swelling is much improved, able to see posterior oropharynx, uvula is not deviated, still has a 3+ tonsils.   Discussed given clinical improvement and his history of hemophilia, we should continue observation on current regiment of abx/steroids for 24 hours. If continues to improve then no further surgery needed. If deteriorates then would take him to the operating room for drainage. He is in agreement with the plan to not proceed to the operating room if it is not indicated but does report he would like to leave AMA because he doesn't like doctors and this is the last time he will be in a hospital for 5 years. Discussed it is advisable to stay for 24 hours for observation to confirm he is continuing to improve and discharge with close observation. Also discussed with his sister Zina over the phone. She was due to come upstairs in 30min therefore decided to come back to discuss in person with the sister and Clyde.    Dannielle Kunz MD    Laryngology    [facility]  Department of  Otolaryngology - Head and Neck Surgery  Clinics & Surgery Center  [address] [place] [address]  Appointment line: [phone]  Fax: [phone]  [email or web address]

## 2021-09-04 NOTE — PLAN OF CARE
BP (!) 142/83 (BP Location: Left arm)   Pulse 61   Temp 99  F (37.2  C) (Oral)   Resp 16   Wt 76 kg (167 lb 8.8 oz)   SpO2 95%   BMI 23.37 kg/m    Pt arrived to unit from  at around 1830. Hypertensive but not within notifying parameters, all other VSS on RA. A&Ox4, give PRN ativan before bed. Denies pain & nausea, on CLD, plan for NPO at midnight. Right PIV x2, one infusing factor at continuous rate of 0.7 & plan to keep running until surgery tomorrow AM, the other SL Right tonsil swollen & red, unchanged per pt. Passing gas, last BM this AM. Voiding spont, not saving. Up ad pee. Plan for I&D tomorrow AM, continue POC.

## 2021-09-04 NOTE — PROGRESS NOTES
Pt leaving AMA. MD came and discussed risks of leaving and pt still wanting to leave. Decadron and clindamycin given. AMA form signed. PIV x2 removed. Pt belongings gathered and pt walked down to discharge pharmacy with sister.

## 2021-09-04 NOTE — PLAN OF CARE
Admitted/transferred from:   2 RN full skin assessment completed by Janina David RN and Rick LARA.  Skin assessment finding: skin intact, no problems.  Interventions/actions: N/A    Will continue to monitor.

## 2021-09-05 NOTE — DISCHARGE SUMMARY
"[facility]  Hospitalist Discharge Summary      Date of Admission:  9/3/2021  Date of Discharge:  9/4/2021 10:30 AM  Discharging Provider: Tom Daugherty MD  Discharge Team: Hospitalist Service, Gold 8    Discharge Diagnoses   Left against medical advice  Sepsis 2/2 Peritonsillar Abscess  Hemophilia A    Follow-ups Needed After Discharge   Patient PCP (patient did not want to follow up) will need to follow up on Peritonsillar abscess, hematology for Hemophilia, decadron and clindamycin use. Recommend CBC.    Unresulted Labs Ordered in the Past 30 Days of this Admission     Date and Time Order Name Status Description    9/4/2021  7:31 AM Prepare red blood cells (unit) Preliminary     9/4/2021  7:31 AM Prepare red blood cells (unit) Preliminary           Discharge Disposition   Left against medical advice  Condition at discharge: [facility]      Hospital Course      Clyde Becerra is a 21 year old male with a past medical history of hemophilia A, Lyme's disease, and mononucleosis who presents as transfer with a peritonsillar abscess. He was admitted to internal medicine for further treatment and monitoring but left AMA 9/4/21 as below.       Sepsis 2/2 Peritonsillar Abscess   Patient presented to [facility] ED with complaints of sore throat, odynophagia, and right neck swelling. He was found to have a peritonsillar abscess and was admitted for possible surgical drainage in setting of his hemophilia.  He was started on Unasyn, then switched to clindamycin due to his amoxicillin allergy.  Patient had progression of his symptoms requiring an overnight stay in the ICU over concerns of airway compromise. He received a dose of cefepime in addition to his clindamycin. He is tolerating his oral secretions but continues to have pain. WBC prior to transfer 18, currently 14.8. Most recent CT Head Neck 9/3/21 with \"interval increase in size of the right palatine tonsillar/peritonsillar abscess, " currently measuring 3.0 x 3.0 x 3.5 cm, previously measuring 1.5 x 1.9 x 2.6 cm. There are increased inflammatory changes/edema extending inferiorly along the right pharyngeal mucosa with thickening of the right aryepiglottic fold and effacement of the right piriform sinus. Patient seen in AM, allowed for limited exam, and he decided to leave Against Medical Advice because he wanted to leave the hospital and go home. He had full capacity and decision making. Sister and aunt were in room who also wanted patient to stay for monitoring/antibiotics. Specific risks mentioned (but not limited to) were worsening infection, worsening sepsis, bleeding, airway compromise, fevers, vomiting, altered mental status, spread of infection, permanent disability, and death. He understood the risks involved, was able to verbalize them back, and mentioned that he was not afraid to die. Explained benefit of hospital stay including antibiotic use, monitoring, improvement in his hemophilia/abscess/sepsis, and possible intervention per ENT. Reiterated that patient is to take the clindamycin and steroids when he leaves the hospital. Discussed return to ED if he should have worsening of his condition (including but not limited to) worsening fevers, chills, difficulty speaking, difficulty breathing, bleeding, low blood pressures, cranial nerve deficits, syncope. Discussed that he can return to ED at anytime. Time was given to ask questions, he elected not to ask questions. Patient aware that he had follow up appointment on Tuesday 9/7/21 with ENT. Explained that he needs PCP and hematology follow up. Writer again emphasized that he would be leaving the hospital against medical advice, patient elected to leave. Family (sister and aunt) present during exchange with patient and during his signing of AMA paperwork. Discussed with ENT and prescription for clindamycin and steroids placed. ENT reported that patient to have follow up on Tuesday  9/6/21, per patient he might not follow up.     Hemophilia A - Patient's baseline factor VIII level ~2%.  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 Per heme recs, give 50U/kg bolus of factor VIII, then obtain post dose factor VIII level. Then will start 300 U/hr gtt with AM factor VIII levels. Patient left AMA as above and should have follow up with hematology (patient informed as above_      Consultations This Hospital Stay   ENT IP CONSULT  HEMATOLOGY ADULT IP CONSULT  VASCULAR ACCESS CARE ADULT IP CONSULT    Code Status   Prior    Time Spent on this Encounter   I, Tom Daugherty MD, personally saw the patient today and spent greater than 30 minutes discharging this patient.       Tom Daugherty MD  [facility] [address] [address]  Phone: [phone]  ______________________________________________________________________    Physical Exam   Vital Signs:                    Weight: 167 lbs 8.79 oz  GENERAL: Alert and oriented x 3. NAD.   HEENT: Right neck with swelling. Pain on palpation.   CV: RRR. S1, S2. No murmurs appreciated.   RESPIRATORY: Effort normal on RA. Lungs CTAB with no wheezing, rales, rhonchi.   GI:  No tenderness, rebound, guarding. No lesions.   NEUROLOGICAL: No focal deficits. Moves all extremities.         Primary Care Physician   No primary care provider on file.    Discharge Orders   No discharge procedures on file.    Significant Results and Procedures   Most Recent 3 CBC's:Recent Labs   Lab Test 09/03/21  1537   WBC 14.8*   HGB 14.7   MCV 82        Most Recent 3 BMP's:Recent Labs   Lab Test 09/03/21  1537      POTASSIUM 4.1   CHLORIDE 105   CO2 27   BUN 6*   CR 0.78   ANIONGAP 4   KHOA 9.2   GLC 89   , No results found for this or any previous visit.    Discharge Medications   Discharge Medication List as of 9/4/2021 11:29 AM      START taking these medications    Details   clindamycin (CLEOCIN) 300 MG capsule Take 2 capsules (600  mg) by mouth 3 times daily, Disp-40 capsule, R-0, E-Prescribe      methylPREDNISolone (MEDROL) 4 MG tablet Take 1 tablet (4 mg) by mouth daily, Disp-5 tablet, R-0, E-Prescribe         CONTINUE these medications which have NOT CHANGED    Details   fexofenadine (ALLEGRA) 60 MG tablet Take 60 mg by mouth as needed, Historical           Allergies   Allergies   Allergen Reactions     Amoxicillin Hives and Rash     might have also been from the virus itself  Hives        Nsaids Other (See Comments)     Pt has moderate hemophilia A

## 2021-09-07 ENCOUNTER — OFFICE VISIT (OUTPATIENT)
Dept: OTOLARYNGOLOGY | Facility: CLINIC | Age: 21
End: 2021-09-07
Payer: COMMERCIAL

## 2021-09-07 ENCOUNTER — TELEPHONE (OUTPATIENT)
Dept: HEMATOLOGY | Facility: CLINIC | Age: 21
End: 2021-09-07

## 2021-09-07 ENCOUNTER — TELEPHONE (OUTPATIENT)
Dept: OTOLARYNGOLOGY | Facility: CLINIC | Age: 21
End: 2021-09-07

## 2021-09-07 VITALS
HEIGHT: 72 IN | WEIGHT: 172 LBS | HEART RATE: 58 BPM | TEMPERATURE: 98.6 F | OXYGEN SATURATION: 97 % | BODY MASS INDEX: 23.3 KG/M2

## 2021-09-07 DIAGNOSIS — J36 PERITONSILLAR ABSCESS: Primary | ICD-10-CM

## 2021-09-07 PROCEDURE — 99202 OFFICE O/P NEW SF 15 MIN: CPT | Performed by: OTOLARYNGOLOGY

## 2021-09-07 ASSESSMENT — PAIN SCALES - GENERAL: PAINLEVEL: NO PAIN (0)

## 2021-09-07 ASSESSMENT — MIFFLIN-ST. JEOR: SCORE: 1813.19

## 2021-09-07 NOTE — LETTER
Date:September 8, 2021      Patient was self referred, no letter generated. Do not send.        [facility] Health Information

## 2021-09-07 NOTE — NURSING NOTE
Chief Complaint   Patient presents with     RECHECK     follow up       Pulse 58, temperature 98.6  F (37  C), temperature source Temporal, height 1.829 m (6'), weight 78 kg (172 lb), SpO2 97 %.    Magdy Villarreal, EMT

## 2021-09-07 NOTE — PATIENT INSTRUCTIONS
1.  You were seen in the ENT Clinic today by . If you have any questions or concerns after your appointment, please call [phone]. Press option #1 for scheduling related needs. Press option #3 for Nurse advice.    2.   has recommended  the following:   - finish antibiotics/steroids   -follow up with     3.  Plan is to return to clinic as needed or sooner with any new or worsening symptoms      Nata Daniels LPN  [phone]  [facility] Otol[facility]

## 2021-09-07 NOTE — TELEPHONE ENCOUNTER
"RN received a phone call from Zina, Clyde Moore's sister. There is consent on file to share medical information with her. Per Zina's voicemail, Clyde is hospitalized at [facility] with a ashvin-tonsillar abscess and is requiring surgery.     RN called Zina back. Zina states that Clyde is wishing to leave AMA- and does not want to get surgery at this hospital. Zina is wondering if he can be transferred to [facility].     RN conferred with Dr. Osei who states patient is in the process of being transferred to the [facility]. RN called Zina back and Zina was able to put RN on the phone with Clyde.     Clyde states he does not remember this RN. He believes everyone at [facility] \"are idiots\" and \"do not know anything about hemophilia, the people at the [facility] clinic knew more about hemophilia\". He then used further colorful language. RN tried to assure Clyde that we were having him transferred to the [facility] and that he should stay until this happens. He then stated he no longer wished to speak to me. RN then gave Zina her direct line and asked to please have Clyde call her if he felt differently about communicating, or wished to discuss further care planning.     Caitlyn Zhang, MSN, RN, PHN - Nurse Clinician - [facility] - [phone]        "

## 2021-09-07 NOTE — PROGRESS NOTES
[facility]   at the [facility]   Otolaryngology Clinic     Patient: Clyde Becerra    MRN: [id number]    : 2000    Age/Gender: 21 year old male  Date of Service: 2021  Rendering Provider:   Dannielle Kunz MD     Chief Complaint   History of right PTA  History of hemophilia  Interval History   HISTORY OF PRESENT ILLNESS: Mr. Becerra is a 21 year old male is being followed for history of right PTA. he was initially seen on 9/3/21. Please refer to this note for full history.   Of note, he has hemophilia.    Today, he presents for follow up. he reports:  - doing well  - still some discomfort with yawning  - and feels his right ear clogged     PAST MEDICAL HISTORY:   Past Medical History:   Diagnosis Date     Hemophilia A (H)        PAST SURGICAL HISTORY: No past surgical history on file.    CURRENT MEDICATIONS:   Current Outpatient Medications:      clindamycin (CLEOCIN) 300 MG capsule, Take 2 capsules (600 mg) by mouth 3 times daily, Disp: 40 capsule, Rfl: 0     fexofenadine (ALLEGRA) 60 MG tablet, Take 60 mg by mouth as needed, Disp: , Rfl:      methylPREDNISolone (MEDROL) 4 MG tablet, Take 1 tablet (4 mg) by mouth daily, Disp: 5 tablet, Rfl: 0    ALLERGIES: Amoxicillin and Nsaids    SOCIAL HISTORY:    Social History     Socioeconomic History     Marital status: Single     Spouse name: Not on file     Number of children: Not on file     Years of education: Not on file     Highest education level: Not on file   Occupational History     Not on file   Tobacco Use     Smoking status: Not on file   Substance and Sexual Activity     Alcohol use: Not on file     Drug use: Not on file     Sexual activity: Not on file   Other Topics Concern     Not on file   Social History Narrative     Not on file     Social Determinants of Health     Financial Resource Strain:      Difficulty of Paying Living Expenses:    Food Insecurity:      Worried About Running Out of Food in the Last Year:      Ran Out  of Food in the Last Year:    Transportation Needs:      Lack of Transportation (Medical):      Lack of Transportation (Non-Medical):    Physical Activity:      Days of Exercise per Week:      Minutes of Exercise per Session:    Stress:      Feeling of Stress :    Social Connections:      Frequency of Communication with Friends and Family:      Frequency of Social Gatherings with Friends and Family:      Attends Yazidism Services:      Active Member of Clubs or Organizations:      Attends Club or Organization Meetings:      Marital Status:    Intimate Partner Violence:      Fear of Current or Ex-Partner:      Emotionally Abused:      Physically Abused:      Sexually Abused:          FAMILY HISTORY: No family history on file.   Non-contributory for problems with anesthesia    REVIEW OF SYSTEMS:   The patient was asked a 14 point review of systems regarding constitutional symptoms, eye symptoms, ears, nose, mouth, throat symptoms, cardiovascular symptoms, respiratory symptoms, gastrointestinal symptoms, genitourinary symptoms, musculoskeletal symptoms, integumentary symptoms, neurological symptoms, psychiatric symptoms, endocrine symptoms, hematologic/lymphatic symptoms, and allergic/ immunologic symptoms.   The pertinent factors have been included in the HPI and below.  Patient Supplied Answers to Review of Systems  No flowsheet data found.    Physical Examination   The patient underwent a physical examination as described below. The pertinent positive and negative findings are summarized after the description of the examination.  Constitutional: The patient's developmental and nutritional status was assessed. The patient's voice quality was assessed.  Head and Face: The head and face were inspected for deformities. The sinuses were palpated. The salivary glands were palpated. Facial muscle strength was assessed bilaterally.  Eyes: Extraocular movements and primary gaze alignment were assessed.  Ears, Nose, Mouth and  Throat: The ears and nose were examined for deformities. The nasal septum, mucosa, and turbinates were inspected by anterior rhinoscopy. The lips, teeth, and gums were examined for abnormalities. The oral mucosa, tongue, palate, tonsils, lateral and posterior pharynx were inspected for the presence of asymmetry or mucosal lesions.    Neck: The tracheal position was noted, and the neck mass palpated to determine if there were any asymmetries, abnormal neck masses, thyromegally, or thyroid nodules.  Respiratory: The nature of the breathing and chest expansion/symmetry was observed.  Cardiovascular: The patient was examined to determine the presence of any edema or jugular venous distension.  Abdomen: The contour of the abdomen was noted.  Lymphatic: The patient was examined for infraclavicular lymphadenopathy.  Musculoskeletal: The patient was inspected for the presence of skeletal deformities.  Extremities: The extremities were examined for any clubbing or cyanosis.  Skin: The skin was examined for inflammatory or neoplastic conditions.  Neurologic: The patient's orientation, mood, and affect were noted. The cranial nerve  functions were examined.  Other pertinent positive and negative findings on physical examination:   OC/OP: no lesions, uvula midline, soft palate elevates symmetrically, right tonsil 3+, left tonsil 2 +   Neck: no lesions, no TH tenderness to palpation    All other physical examination findings were within normal limits and noncontributory.       Review of Relevant Clinical Data   Imaging   CT neck 9/3/21    9/1/21       Labs:  No results found for: TSH  Lab Results   Component Value Date     09/03/2021    CO2 27 09/03/2021    BUN 6 (L) 09/03/2021     Lab Results   Component Value Date    WBC 14.8 (H) 09/03/2021    HGB 14.7 09/03/2021    HCT 41.2 09/03/2021    MCV 82 09/03/2021     09/03/2021     No results found for: PT, PTT, INR  No results found for: CARLEEN  No components found for:  RHEUMATOIDFACTOR,  RF  No results found for: CRP  No components found for: CKTOT, URICACID  No components found for: C3, C4, DSDNAAB, NDNAABIFA  No results found for: MPOAB    Patient reported Quality of Life (QOL) Measures   Patient Supplied Answers To VHI Questionnaire  No flowsheet data found.      Patient Supplied Answers To EAT Questionnaire  No flowsheet data found.      Patient Supplied Answers To CSI Questionnaire  No flowsheet data found.      Patient Supplied Answers to Dyspnea Index Questionnaire:  No flowsheet data found.    Impression & Plan     IMPRESSION: Mr. Becerra is a 21 year old male who is being seen for the followin. PTA  - improved with steroid and abx  - exam today shows 3+ tonsil on the right, left tonsil is 2+  - no PTA anymore  - no trismus  Plan  - finish abx/steroids  - follow up with Dr Barajas     RETURN VISIT: as needed    Dannielle Kunz MD    Laryngology    [facility]  Department of  Otolaryngology - Head and Neck Surgery  Clinics & Surgery Center  [address] [place] [address]  Appointment line: [phone]  Fax: [phone]  [email or web address]

## 2021-09-07 NOTE — LETTER
2021       RE: Clyde Becerra  [address]  [place] [address]     Dear Colleague,    Thank you for referring your patient, Clyde Becerra, to the [facility] [place] at [facility]. Please see a copy of my visit note below.        [facility]   at the [facility]   Otolaryngology Clinic     Patient: Clyde Becerra    MRN: [id number]    : 2000    Age/Gender: 21 year old male  Date of Service: 2021  Rendering Provider:   Dannielle Kunz MD     Chief Complaint   History of right PTA  History of hemophilia  Interval History   HISTORY OF PRESENT ILLNESS: Mr. Becerra is a 21 year old male is being followed for history of right PTA. he was initially seen on 9/3/21. Please refer to this note for full history.   Of note, he has hemophilia.    Today, he presents for follow up. he reports:  - doing well  - still some discomfort with yawning  - and feels his right ear clogged     PAST MEDICAL HISTORY:   Past Medical History:   Diagnosis Date     Hemophilia A (H)        PAST SURGICAL HISTORY: No past surgical history on file.    CURRENT MEDICATIONS:   Current Outpatient Medications:      clindamycin (CLEOCIN) 300 MG capsule, Take 2 capsules (600 mg) by mouth 3 times daily, Disp: 40 capsule, Rfl: 0     fexofenadine (ALLEGRA) 60 MG tablet, Take 60 mg by mouth as needed, Disp: , Rfl:      methylPREDNISolone (MEDROL) 4 MG tablet, Take 1 tablet (4 mg) by mouth daily, Disp: 5 tablet, Rfl: 0    ALLERGIES: Amoxicillin and Nsaids    SOCIAL HISTORY:    Social History     Socioeconomic History     Marital status: Single     Spouse name: Not on file     Number of children: Not on file     Years of education: Not on file     Highest education level: Not on file   Occupational History     Not on file   Tobacco Use     Smoking status: Not on file   Substance and Sexual Activity     Alcohol use: Not on file     Drug use:  Not on file     Sexual activity: Not on file   Other Topics Concern     Not on file   Social History Narrative     Not on file     Social Determinants of Health     Financial Resource Strain:      Difficulty of Paying Living Expenses:    Food Insecurity:      Worried About Running Out of Food in the Last Year:      Ran Out of Food in the Last Year:    Transportation Needs:      Lack of Transportation (Medical):      Lack of Transportation (Non-Medical):    Physical Activity:      Days of Exercise per Week:      Minutes of Exercise per Session:    Stress:      Feeling of Stress :    Social Connections:      Frequency of Communication with Friends and Family:      Frequency of Social Gatherings with Friends and Family:      Attends Mor[employer] Services:      Active Member of Clubs or Organizations:      Attends Club or Organization Meetings:      Marital Status:    Intimate Partner Violence:      Fear of Current or Ex-Partner:      Emotionally Abused:      Physically Abused:      Sexually Abused:          FAMILY HISTORY: No family history on file.   Non-contributory for problems with anesthesia    REVIEW OF SYSTEMS:   The patient was asked a 14 point review of systems regarding constitutional symptoms, eye symptoms, ears, nose, mouth, throat symptoms, cardiovascular symptoms, respiratory symptoms, gastrointestinal symptoms, genitourinary symptoms, musculoskeletal symptoms, integumentary symptoms, neurological symptoms, psychiatric symptoms, endocrine symptoms, hematologic/lymphatic symptoms, and allergic/ immunologic symptoms.   The pertinent factors have been included in the HPI and below.  Patient Supplied Answers to Review of Systems  No flowsheet data found.    Physical Examination   The patient underwent a physical examination as described below. The pertinent positive and negative findings are summarized after the description of the examination.  Constitutional: The patient's developmental and nutritional status  was assessed. The patient's voice quality was assessed.  Head and Face: The head and face were inspected for deformities. The sinuses were palpated. The salivary glands were palpated. Facial muscle strength was assessed bilaterally.  Eyes: Extraocular movements and primary gaze alignment were assessed.  Ears, Nose, Mouth and Throat: The ears and nose were examined for deformities. The nasal septum, mucosa, and turbinates were inspected by anterior rhinoscopy. The lips, teeth, and gums were examined for abnormalities. The oral mucosa, tongue, palate, tonsils, lateral and posterior pharynx were inspected for the presence of asymmetry or mucosal lesions.    Neck: The tracheal position was noted, and the neck mass palpated to determine if there were any asymmetries, abnormal neck masses, thyromegally, or thyroid nodules.  Respiratory: The nature of the breathing and chest expansion/symmetry was observed.  Cardiovascular: The patient was examined to determine the presence of any edema or jugular venous distension.  Abdomen: The contour of the abdomen was noted.  Lymphatic: The patient was examined for infraclavicular lymphadenopathy.  Musculoskeletal: The patient was inspected for the presence of skeletal deformities.  Extremities: The extremities were examined for any clubbing or cyanosis.  Skin: The skin was examined for inflammatory or neoplastic conditions.  Neurologic: The patient's orientation, mood, and affect were noted. The cranial nerve  functions were examined.  Other pertinent positive and negative findings on physical examination:   OC/OP: no lesions, uvula midline, soft palate elevates symmetrically, right tonsil 3+, left tonsil 2 +   Neck: no lesions, no TH tenderness to palpation    All other physical examination findings were within normal limits and noncontributory.       Review of Relevant Clinical Data   Imaging   CT neck 9/3/21    9/1/21       Labs:  No results found for: TSH  Lab Results   Component  Value Date     2021    CO2 27 2021    BUN 6 (L) 2021     Lab Results   Component Value Date    WBC 14.8 (H) 2021    HGB 14.7 2021    HCT 41.2 2021    MCV 82 2021     2021     No results found for: PT, PTT, INR  No results found for: CARLEEN  No components found for: RHEUMATOIDFACTOR,  RF  No results found for: CRP  No components found for: CKTOT, URICACID  No components found for: C3, C4, DSDNAAB, NDNAABIFA  No results found for: MPOAB    Patient reported Quality of Life (QOL) Measures   Patient Supplied Answers To VHI Questionnaire  No flowsheet data found.      Patient Supplied Answers To EAT Questionnaire  No flowsheet data found.      Patient Supplied Answers To CSI Questionnaire  No flowsheet data found.      Patient Supplied Answers to Dyspnea Index Questionnaire:  No flowsheet data found.    Impression & Plan     IMPRESSION: Mr. Becerra is a 21 year old male who is being seen for the followin. PTA  - improved with steroid and abx  - exam today shows 3+ tonsil on the right, left tonsil is 2+  - no PTA anymore  - no trismus  Plan  - finish abx/steroids  - follow up with Dr Barajas     RETURN VISIT: as needed    Dannielle Kunz MD    Laryngology    [facility]  Department of  Otolaryngology - Head and Neck Surgery  Clinics & Surgery Center  [address] [place] [address]  Appointment line: [phone]  Fax: [phone]  [email or web address]         Again, thank you for allowing me to participate in the care of your patient.      Sincerely,    Dannielle Kunz MD

## 2021-09-07 NOTE — TELEPHONE ENCOUNTER
[facility] Call Center    Phone Message    May a detailed message be left on voicemail: yes     Reason for Call: Other:   Pt's mom thought that pt might have an appt with Kunz this morning at 8am but nothing on the schedule. Please follow-up with mom to advise.     Mom wants to note that Pt is having lot of burning when peeing but UTI came back negative as well. .    Action Taken: Other:   ent    Travel Screening: Not Applicable

## 2021-09-08 ENCOUNTER — TELEPHONE (OUTPATIENT)
Dept: OTOLARYNGOLOGY | Facility: CLINIC | Age: 21
End: 2021-09-08

## 2021-09-08 NOTE — TELEPHONE ENCOUNTER
----- Message from Dannielle Kunz MD sent at 9/7/2021  1:19 PM CDT -----  Franklyn Gallardo  I am referring Clyde to Dr Barajas to see in about 1 month at [facility] for history of peritonsillar abscess.    I told him you would reach out    Thank you,  Dannielle

## 2021-10-04 ENCOUNTER — OFFICE VISIT (OUTPATIENT)
Dept: OTOLARYNGOLOGY | Facility: CLINIC | Age: 21
End: 2021-10-04
Payer: COMMERCIAL

## 2021-10-04 VITALS — DIASTOLIC BLOOD PRESSURE: 74 MMHG | OXYGEN SATURATION: 98 % | SYSTOLIC BLOOD PRESSURE: 116 MMHG | HEART RATE: 67 BPM

## 2021-10-04 DIAGNOSIS — J36 PERITONSILLAR ABSCESS: Primary | ICD-10-CM

## 2021-10-04 PROCEDURE — 99213 OFFICE O/P EST LOW 20 MIN: CPT | Performed by: OTOLARYNGOLOGY

## 2021-10-04 NOTE — PROGRESS NOTES
Otolaryngology Adult Consultation    Patient: Clyde Becerra  : 2000    HPI:  Clyde Becerra is a 21 year old male seen today in the Otolaryngology Clinic for right peritonsillar abscess follow-up.  Patient is in clinic today with his mom.  His past medical history is significant for hemophilia A.  In early September he developed a right peritonsillar abscess.  He was admitted to the [facility] for observation.  He showed remarkable improvement on antibiotics and he was able to discharge home with antibiotics.  Patient reports that he took 3 days of antibiotics and then stopped because it was becoming a hassle.  He also is feeling a lot better.  He denies any current throat pain or difficulty with swallowing.  His right ear feels a lot better.  Mom has been concerned about residual diarrhea.  Patient reports he is never had a problem with his tonsils prior to this.    Medications:  Current Outpatient Medications   Medication     clindamycin (CLEOCIN) 300 MG capsule     fexofenadine (ALLEGRA) 60 MG tablet     methylPREDNISolone (MEDROL) 4 MG tablet     No current facility-administered medications for this visit.       Allergies: Amoxicillin and Nsaids     PMH:  Past Medical History:   Diagnosis Date     Hemophilia A (H)        PSH:  No past surgical history on file.    FH:  No family history on file.     SH:  Social History     Tobacco Use     Smoking status: Never Smoker     Smokeless tobacco: Never Used   Substance Use Topics     Alcohol use: None     Drug use: None         Physical Exam:    GEN:  The patient is alert, oriented and in no acute distress.  HEAD:  Head, face scalp is grossly normal.  EARS:  Ears demonstrate normal canals, auricles and tympanic membranes.  NOSE:  External nose is straight, skin is normal.                Intranasal exam (anterior rhinoscopy) reveals normal moist mucosa, turbinate                  tissue without edema, erythema or crusting.   Septum mainly in the midline.  ORAL:  Oral cavity shows healthy mucosa with out ulceration, masses or other lesions                involving the tongue, palate, buccal mucosa, floor of mouth or gingiva.  Tonsils are 3+.  Peritonsillar areas on both sides are soft with no evidence of fluctuance or induration.  Palate is symmetric.                NECK:   Neck is without adenopathy, thyroid or salivary gland masses.  PUL: normal work of breathing; no stridor    Assessment/Plan: Patient presents with a history of peritonsillar abscess on the right side that was resolved with antibiotics.  He does not have a history of recurrent tonsillitis.  My recommendation at this time would be observation.  I believe there is a reasonable chance that he may not have further issues with his tonsils.  However I did discuss with the patient that he may go on to develop some issues with recurrent tonsillitis or even another peritonsillar abscess.  However given his hemophilia a, I do think that observation is worthwhile.  I did discuss with him that if he were to develop another one-sided sore throat or intense throat pain that he should call our clinic immediately we can place him on antibiotics and steroids.  I also discussed with the patient the importance of taking the full course of an antibiotic.  He was given the number to my nurse.  I also recommended that he try probiotic to help with the diarrhea.  If no improvement after a week he should call our clinic and we can set him up for C. difficile evaluation.    I spent a total of 25 minutes total time towards today's visit.  Time spent included seeing and evaluating Clyde Becerra during today's office visit, which included counseling the patient.  Time was also spent reviewing records/tests; and documenting clinical information in the electronic health record.

## 2021-10-04 NOTE — PATIENT INSTRUCTIONS
For diarrhea, we recommend taking a probiotic (yogurt or pill) for 1 week.  You can purchase the probiotic pill at [employer] (Lactobacillus).  If no improvement after 1 week, please call our clinic and we will arrange a test for C. Diff    If you ever have a bad one-sided sore throat, please call our clinic nurse and we will prescribe antibiotics immediately.

## 2021-10-04 NOTE — LETTER
10/4/2021         RE: Clyde Becerra  [address]  [place] [facility] [address]        Dear Colleague,    Thank you for referring your patient, Clyde Becerra, to the [facility]. Please see a copy of my visit note below.    Otolaryngology Adult Consultation    Patient: Clyde Becerra  : 2000    HPI:  Clyde Becerra is a 21 year old male seen today in the Otolaryngology Clinic for right peritonsillar abscess follow-up.  Patient is in clinic today with his mom.  His past medical history is significant for hemophilia A.  In early September he developed a right peritonsillar abscess.  He was admitted to the [facility] for observation.  He showed remarkable improvement on antibiotics and he was able to discharge home with antibiotics.  Patient reports that he took 3 days of antibiotics and then stopped because it was becoming a hassle.  He also is feeling a lot better.  He denies any current throat pain or difficulty with swallowing.  His right ear feels a lot better.  Mom has been concerned about residual diarrhea.  Patient reports he is never had a problem with his tonsils prior to this.    Medications:  Current Outpatient Medications   Medication     clindamycin (CLEOCIN) 300 MG capsule     fexofenadine (ALLEGRA) 60 MG tablet     methylPREDNISolone (MEDROL) 4 MG tablet     No current facility-administered medications for this visit.       Allergies: Amoxicillin and Nsaids     PMH:  Past Medical History:   Diagnosis Date     Hemophilia A (H)        PSH:  No past surgical history on file.    FH:  No family history on file.     SH:  Social History     Tobacco Use     Smoking status: Never Smoker     Smokeless tobacco: Never Used   Substance Use Topics     Alcohol use: None     Drug use: None         Physical Exam:    GEN:  The patient is alert, oriented and in no acute distress.  HEAD:  Head, face scalp is grossly  normal.  EARS:  Ears demonstrate normal canals, auricles and tympanic membranes.  NOSE:  External nose is straight, skin is normal.                Intranasal exam (anterior rhinoscopy) reveals normal moist mucosa, turbinate                  tissue without edema, erythema or crusting.  Septum mainly in the midline.  ORAL:  Oral cavity shows healthy mucosa with out ulceration, masses or other lesions                involving the tongue, palate, buccal mucosa, floor of mouth or gingiva.  Tonsils are 3+.  Peritonsillar areas on both sides are soft with no evidence of fluctuance or induration.  Palate is symmetric.                NECK:   Neck is without adenopathy, thyroid or salivary gland masses.  PUL: normal work of breathing; no stridor    Assessment/Plan: Patient presents with a history of peritonsillar abscess on the right side that was resolved with antibiotics.  He does not have a history of recurrent tonsillitis.  My recommendation at this time would be observation.  I believe there is a reasonable chance that he may not have further issues with his tonsils.  However I did discuss with the patient that he may go on to develop some issues with recurrent tonsillitis or even another peritonsillar abscess.  However given his hemophilia a, I do think that observation is worthwhile.  I did discuss with him that if he were to develop another one-sided sore throat or intense throat pain that he should call our clinic immediately we can place him on antibiotics and steroids.  I also discussed with the patient the importance of taking the full course of an antibiotic.  He was given the number to my nurse.  I also recommended that he try probiotic to help with the diarrhea.  If no improvement after a week he should call our clinic and we can set him up for C. difficile evaluation.    I spent a total of 25 minutes total time towards today's visit.  Time spent included seeing and evaluating Clyde Becerra during  today's office visit, which included counseling the patient.  Time was also spent reviewing records/tests; and documenting clinical information in the electronic health record.           Again, thank you for allowing me to participate in the care of your patient.        Sincerely,        Madelyn Barajas MD

## 2021-10-04 NOTE — NURSING NOTE
Clyde Becerra's goals for this visit include:   Chief Complaint   Patient presents with     Consult For     Peritonsillar abscess. Referred by Dr. Kunz.     He requests these members of his care team be copied on today's visit information:     PCP: No Ref-Primary, Physician    Referring Provider:  No referring provider defined for this encounter.    /74 (BP Location: Right arm, Patient Position: Sitting, Cuff Size: Adult Regular)   Pulse 67   SpO2 98%     Do you need any medication refills at today's visit? No  Marybeth Hong [facility]

## 2021-10-07 ENCOUNTER — OFFICE VISIT (OUTPATIENT)
Dept: PHYSICAL THERAPY | Facility: CLINIC | Age: 21
End: 2021-10-07

## 2021-10-07 ENCOUNTER — OFFICE VISIT (OUTPATIENT)
Dept: HEMATOLOGY | Facility: CLINIC | Age: 21
End: 2021-10-07
Attending: INTERNAL MEDICINE
Payer: COMMERCIAL

## 2021-10-07 VITALS
WEIGHT: 173 LBS | DIASTOLIC BLOOD PRESSURE: 74 MMHG | RESPIRATION RATE: 14 BRPM | OXYGEN SATURATION: 98 % | TEMPERATURE: 98.1 F | SYSTOLIC BLOOD PRESSURE: 108 MMHG | HEIGHT: 72 IN | HEART RATE: 58 BPM | BODY MASS INDEX: 23.43 KG/M2

## 2021-10-07 DIAGNOSIS — D66 HEMOPHILIA A (H): Primary | ICD-10-CM

## 2021-10-07 PROCEDURE — 99215 OFFICE O/P EST HI 40 MIN: CPT | Performed by: INTERNAL MEDICINE

## 2021-10-07 PROCEDURE — G0463 HOSPITAL OUTPT CLINIC VISIT: HCPCS

## 2021-10-07 RX ORDER — ANTIHEMOPHILIC FACTOR (RECOMBINANT) 3000 (+/-)
KIT INTRAVENOUS
Qty: 3925 EACH | Refills: 0 | Status: SHIPPED | OUTPATIENT
Start: 2021-10-07 | End: 2022-03-01

## 2021-10-07 ASSESSMENT — PAIN SCALES - GENERAL: PAINLEVEL: MILD PAIN (2)

## 2021-10-07 ASSESSMENT — MIFFLIN-ST. JEOR: SCORE: 1817.72

## 2021-10-07 NOTE — PROGRESS NOTES
"Clyde is seen briefly in clinic today to address patient concerns about right wrist pain.  During visit he also identifies h/o bilat calf tightness and requests stretches and plan to address.  Clyde has moderate hemophilia A with baseline of 1-2%.    Clyde reports h/o some falls on his right wrist, no specific injury or fracture that he recalls.  This wrist has had intermittent discomfort and feeling of \"needing to pop\" over the past 6 months.  He also reports that he hit a tree with the back of this wrist during follow through while playing frisbee golf 1 week ago.  Clyde is not currently working.  He participates in frisbee golf and softball where he plays 2nd or 3rd.      Appearance: Right wrist has no visible bruising or signs of injury.  ROM: Full motion throughout all wrist motions and compared to opposite side.  Mild hypermobility in CMC and MCP joints.  Swelling: None  Strength: WNL  Palpation: Unable to elicit his pain  Other: Clyde reports his wrist felt better when he received factor for unrelated tonsil issue, his nurse and provider today were made aware of this information.    Plan/recommendations:     Issued size D tubigrip in double layer for wrist support with instructions for use.    Reviewed applications of ice and heat, and when to use them.    Issued HEP of calf stretches: wall, towel and stair.  Also instructions on warming up prior to activities such as softball with specific focus on calf muscles.      Provided instruction in avoidance of prolonged static positions during softball games.    Reviewed plan of care if injury from impact with ball were to occur, which includes call to our treatment center for instructions.  Nurse to review factor plan around activities.    Will plan to see at next scheduled clinic visit, no follow up needed for today's visit.     Hailey Hall [facility]  Physical Therapist  [facility]  [phone]        "

## 2021-10-07 NOTE — PATIENT INSTRUCTIONS
Today we are going to send you home with a dose of emergency factor.     If you feel like you are having an active bleed please reach out to us to let us know - we may want you to come in for a factor infusion or we may want to send you a dose to infuse.      You can communicate with us by:    Text: [phone]    E-mail: [email or web address]    Phone: [phone] (main line)      [phone] (Caitlyn)    If you want to talk further about treatment opportunities please give us a call. Otherwise we'd like to see you annually.

## 2021-10-11 ENCOUNTER — TELEPHONE (OUTPATIENT)
Dept: HEMATOLOGY | Facility: CLINIC | Age: 21
End: 2021-10-11

## 2021-10-11 NOTE — TELEPHONE ENCOUNTER
[facility]  Brief Social Work Note    Outreach to patient re-introducing self/SW role and to offer assistance as needed.  Await response from patient.    Nellie Kramer, MSW, LICSW, ACM  Clinical   [facility]  Phone: [phone]

## 2021-10-11 NOTE — PROGRESS NOTES
[facility]  [facility]  [address], [address], [place] [address]  Main: [phone], Fax: [phone]        Outpatient Clinic Visit  Date:  10/07/2021    Clyde Reece is a 21-year-old male with moderate hemophilia A (baseline factor VIII level 1-2%) who was last seen here for his initial comprehensive clinic visit in February 2019.  Subsequently, we have been unable to get him to come back for follow-up.  We had telephone contact with him yesterday and he agreed to come in for today's appointment to reconnect with us.    In September 2021, he developed a peritonsillar abscess.  He was initially hospitalized at [facility] and felt to be in need of urgent surgery.  We coordinated transfer here to the [facility] as they were unable to manage his hemophilia at the outside hospital.  After arrival here, he was treated with steroids in addition to antibiotics and had a significant clinical improvement.  He left the next morning against medical advice.  He has been seen in follow-up in ENT clinic and appears to have had resolution of the abscess.    He has not been on regular factor VIII prophylaxis.  However, historically he has had factor at home as all his bleeding episodes have been precipitated by injury.  However, he has a severe needle phobia and often relies on family members (including his aunt who is an internal medicine physician) to assist him with infusions.    In general, he prefers to avoid dealing with his hemophilia.  This is a longstanding issue.  He acknowledges that he has anger issues which are not limited just to his feelings about having hemophilia.    His main concern today is his right wrist.  He injured it playing softball recently.  He says it improved after receiving factor VIII for the peritonsillar abscess.  He denies any other active joint issues.    Physical exam: He appears well.  Full range of motion in all joints.  No  obvious hemophilic arthropathy.  More detailed exam not performed.    Labs: No labs were obtained today.      ASSESSMENT / PLAN:  Hemophilia A (baseline factor VIII level 1-2%).  Today's visit was simply to reestablish contact.  We have not seen Clyde for approximately 2-1/2 years.  We discussed the importance of maintaining regular contact with us so that we can help him when his hemophilia needs to be addressed, such as when he recently developed the peritonsillar abscess.  He seems to understand why having more regular contact with us is important although it was not clear how motivated he is to follow through with that.    He would like to have a dose of factor at home for emergency use.  He is not interested in factor prophylaxis as he does not feel that he bleeds frequently enough to warrant it, and his needle phobia is also a major barrier.  We did discuss other treatment options including emicizumab, but he again does not feel that he needs any specific treatment for his hemophilia on a regular basis.    We gave him our contact information and encouraged him to maintain more regular contact with us.  We discussed the importance of contacting us right away if he has any any bleeding episodes or suspected bleeds.  We also reiterated the importance of getting us involved right away when he develops other medical issues such as the peritonsillar abscess episode back in September.  Lastly, we explained the importance of annual comprehensive hemophilia clinic visits, even if he is not needing treatment for his hemophilia on a regular basis.      Total time 40 minutes, including review of medical records and labs, clinic visit, and documentation.      Pete Osei MD  Professor of Medicine  Division of Hematology, Oncology, and Transplantation  Director, [facility]

## 2022-02-17 ENCOUNTER — TELEPHONE (OUTPATIENT)
Dept: HEMATOLOGY | Facility: CLINIC | Age: 22
End: 2022-02-17
Payer: COMMERCIAL

## 2022-02-17 NOTE — TELEPHONE ENCOUNTER
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   He was seen for an initial comp clinic visit in 2019, and again in October 2021, with difficult sustaining consistent outreach in the interim.  Clyde has shared with writer in the past that he does not like dealing with his hemophilia, and it has been the source of intense anger over time for him. Additionally, he has severe needle phobia.    Writer received the following text message from Clyde today, and discussed exchange with nursing:    \"Hilario Doctor, I've been having some discomfort in a couple places lately, but me being me hasn't wanted to call in then I finally remembered I have this easy way to communicate.  The joints that ache have been problems for a little while but my main pain is in like my stomach like where the top muscle of a 6 pack would be below the ribs (if I had a 6 pack).  I don't really know how else to describe the spot that it hurts other then that.    I don't really picture this as a hemophelia bleed but it's also been going on for about a week now.          This is a picture of me trying to describe where the pain would be on the body (the red is the pain) it's not like the killer pain but it's constant discomfort in like a line like that.\"      Writer replied:  Good Afternoon! I am going to send this over to an RN - is this a good number to reach you? Totally understand how easy texting is, but in this situation a quick phone call would be most efficient and safest. Is that okay with you? And hey!! So great to hear from you, and really creative use of the picture. Can you clarify if you mean the right side or left side in the mean time?\"    Spoke with JANE Brady, who reminded writer we wanted to see him back in clinic anyway, virtual would be fine since we just saw him in person.  Texted him to offer in person visit, stating " virtual could be an option, too.    Await pt response.  Will route to team.    Nellie Kramer, ULICES, [facility] Memorial HospitalSW, ACM  Clinical   [facility]  Phone: [phone]    Coordinated with patient and team.  Pt scheduled for an in-person visit Friday, 2/18 at 12noon.  Address and parking were texted to patient.

## 2022-02-18 ENCOUNTER — ANCILLARY PROCEDURE (OUTPATIENT)
Dept: GENERAL RADIOLOGY | Facility: CLINIC | Age: 22
End: 2022-02-18
Attending: PHYSICIAN ASSISTANT
Payer: COMMERCIAL

## 2022-02-18 ENCOUNTER — OFFICE VISIT (OUTPATIENT)
Dept: HEMATOLOGY | Facility: CLINIC | Age: 22
End: 2022-02-18
Attending: PHYSICIAN ASSISTANT
Payer: COMMERCIAL

## 2022-02-18 ENCOUNTER — ANCILLARY PROCEDURE (OUTPATIENT)
Dept: CT IMAGING | Facility: CLINIC | Age: 22
End: 2022-02-18
Attending: PHYSICIAN ASSISTANT
Payer: COMMERCIAL

## 2022-02-18 VITALS
WEIGHT: 188.9 LBS | HEART RATE: 71 BPM | DIASTOLIC BLOOD PRESSURE: 77 MMHG | BODY MASS INDEX: 25.58 KG/M2 | SYSTOLIC BLOOD PRESSURE: 138 MMHG | RESPIRATION RATE: 15 BRPM | TEMPERATURE: 97.7 F | OXYGEN SATURATION: 97 % | HEIGHT: 72 IN

## 2022-02-18 DIAGNOSIS — M25.531 RIGHT WRIST PAIN: ICD-10-CM

## 2022-02-18 DIAGNOSIS — R10.9 RIGHT SIDED ABDOMINAL PAIN: ICD-10-CM

## 2022-02-18 DIAGNOSIS — D66 HEMOPHILIA A (H): Primary | ICD-10-CM

## 2022-02-18 PROCEDURE — 73110 X-RAY EXAM OF WRIST: CPT | Mod: RT | Performed by: RADIOLOGY

## 2022-02-18 PROCEDURE — 74176 CT ABD & PELVIS W/O CONTRAST: CPT | Mod: GC | Performed by: RADIOLOGY

## 2022-02-18 PROCEDURE — G0463 HOSPITAL OUTPT CLINIC VISIT: HCPCS

## 2022-02-18 PROCEDURE — 99214 OFFICE O/P EST MOD 30 MIN: CPT | Performed by: PHYSICIAN ASSISTANT

## 2022-02-18 ASSESSMENT — PAIN SCALES - GENERAL: PAINLEVEL: MODERATE PAIN (4)
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
## 2022-02-21 ENCOUNTER — CARE COORDINATION (OUTPATIENT)
Dept: HEMATOLOGY | Facility: CLINIC | Age: 22
End: 2022-02-21
Payer: COMMERCIAL

## 2022-02-21 DIAGNOSIS — M25.531 RIGHT WRIST PAIN: ICD-10-CM

## 2022-02-21 DIAGNOSIS — R26.9 ABNORMAL GAIT: ICD-10-CM

## 2022-02-21 DIAGNOSIS — D66 HEMOPHILIA A (H): Primary | ICD-10-CM

## 2022-02-21 NOTE — PROGRESS NOTES
[facility] - COMPREHENSIVE CLINIC  PRE-VISIT CARE COORDINATION NOTE     NAME: Clyde Becerra  :   2000  21 year old    Comp Clinic appointment date/time:  3/1/22 at 12  Confirmation of appointment:  Patient had office visit on 22 and appointment was scheduled at that time for .  Appointment  was cancelled due to weather and rescheduled to 3/1.    Goals for visit:   TBD    Last CC or Office Visit:  Office Visit with Wing Pitt and Latosha Calixto on 22 due to right sided abdominal pain.  Prior to this, he was last see by Dr. Pete Osei on 10/7/21 and his last hemophilia comprehensive clinic visit was 2019.    Diagnosis:  Moderate Hemophilia A  Factor level:  1-2%  Inhibitor Hx:  None  Viral issues:  HIV neg, HCV neg, vaccinated for HAV and HBV  Other health issues:   ADHD, reports anxiety, Hx of peritonsilar abscess  Planned procedures or surgeries:   Hemophilia treatment plan:  On demand as needed (Advate), has a needle phobia  Method of logging infusions:    none    Insurance:  [employer]  Pharmacy:  He could if he needs a dose  Pharmacy Notes:    MPR:  NA  Pharmacokinetics: NA on demand    Joint issues:  Right wrist concerns 10/2021 with h/o falls on this wrist, was given tubigrip and gentle ROM.   Also has h/o left knee Vxdtkhg-Fsdpnv-Otqofvavj syndrome diagnosed , resolved .   Orthopedic procedures:  None  PT orders: orders placed 22 [facility]    Eligible studies:  Subsequent registry (will need labs)
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
## 2022-03-01 ENCOUNTER — OFFICE VISIT (OUTPATIENT)
Dept: HEMATOLOGY | Facility: CLINIC | Age: 22
End: 2022-03-01
Attending: INTERNAL MEDICINE
Payer: COMMERCIAL

## 2022-03-01 ENCOUNTER — ALLIED HEALTH/NURSE VISIT (OUTPATIENT)
Dept: HEMATOLOGY | Facility: CLINIC | Age: 22
End: 2022-03-01

## 2022-03-01 ENCOUNTER — HOSPITAL ENCOUNTER (OUTPATIENT)
Dept: PHYSICAL THERAPY | Facility: CLINIC | Age: 22
Setting detail: THERAPIES SERIES
End: 2022-03-01
Attending: INTERNAL MEDICINE
Payer: COMMERCIAL

## 2022-03-01 VITALS
BODY MASS INDEX: 25.21 KG/M2 | DIASTOLIC BLOOD PRESSURE: 77 MMHG | HEIGHT: 72 IN | SYSTOLIC BLOOD PRESSURE: 153 MMHG | TEMPERATURE: 97.8 F | OXYGEN SATURATION: 100 % | RESPIRATION RATE: 16 BRPM | WEIGHT: 186.1 LBS | HEART RATE: 75 BPM

## 2022-03-01 DIAGNOSIS — D66 HEMOPHILIA A (H): Primary | ICD-10-CM

## 2022-03-01 DIAGNOSIS — Z71.9 ENCOUNTER FOR COUNSELING: Primary | ICD-10-CM

## 2022-03-01 DIAGNOSIS — M25.531 RIGHT WRIST PAIN: ICD-10-CM

## 2022-03-01 PROCEDURE — 85240 CLOT FACTOR VIII AHG 1 STAGE: CPT | Performed by: INTERNAL MEDICINE

## 2022-03-01 PROCEDURE — 36415 COLL VENOUS BLD VENIPUNCTURE: CPT | Performed by: INTERNAL MEDICINE

## 2022-03-01 PROCEDURE — 99215 OFFICE O/P EST HI 40 MIN: CPT | Performed by: INTERNAL MEDICINE

## 2022-03-01 PROCEDURE — G0463 HOSPITAL OUTPT CLINIC VISIT: HCPCS

## 2022-03-01 PROCEDURE — 97530 THERAPEUTIC ACTIVITIES: CPT | Mod: GP | Performed by: PHYSICAL THERAPIST

## 2022-03-01 PROCEDURE — 97161 PT EVAL LOW COMPLEX 20 MIN: CPT | Mod: GP | Performed by: PHYSICAL THERAPIST

## 2022-03-01 RX ORDER — ANTIHEMOPHILIC FACTOR (RECOMBINANT) 3000 (+/-)
KIT INTRAVENOUS
Qty: 4220 EACH | Refills: 0 | Status: SHIPPED | OUTPATIENT
Start: 2022-03-01 | End: 2022-09-12

## 2022-03-01 ASSESSMENT — PAIN SCALES - GENERAL: PAINLEVEL: NO PAIN (0)

## 2022-03-01 NOTE — PROGRESS NOTES
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  He established adult care at this clinic on 2019.  He presented to clinic today for a routine comprehensive clinic evaluation.  Met with the patient 1:1 to complete an updated psychosocial evaluation.    Living Situation:   Clyde is currently living in his vehicle.  Clyde had been renting an apartment near the [facility].  His lease ended 2021 and he opted not to renew it due to the high cost of rent. He stated he can stay at his sister's house in [place] or with friends, but has difficulty asking for help so only stays there sporatically.  He can stay at his parents's home with the condition that he participate in therapy; he has not had contact with them for months.    Offered resources to locate more permanent housing, including single adult shelter with services in [place], but patient stated \"I'm not doing that.\"  He does have concerns about safety while living in his vehicle so does not sleep well but declined offers for assistance, stating he plans to save money and work on this on his own.      He showers at the gym ([employer], open ), does laundry at his sister's house, and if he needs new clothes or other items, his sister will coordinate with his parents on his behalf.  He stated he has enough in savings and winnings from gambling to get food.    Family/Social Support:  Clyde's parents Chad and Jonnathan live in [place].  His older sister, Zina, age 27, lives in [place] with her boyfriend.  He has had a tense relationship with his parents since high school, and has experienced periods of homelessness when they would not allow him to stay at their home.  He stated this is the longest they " have gone without contact, and described circumstances leading up to their current conflict.  He maintains connection with a small group of close friends and is not in a romantic relationship at this time.    Functional Status: Independent with ADLs and IADLs.    Current Community Services:    Can use [facility] if needed.    Past Community Services:   Not discussed today.    Chemical Use:    Clyde reported daily marijuana use, social alcohol use, and e-cigarette use.  He denied psychosocial impact related to use.  At times, he uses ilicit substances to help him stay awake (adderall).  He declined information or resources on changing use.    Mental/Emotional Health:   PHQ-9: 25.  BHARTI-7: 20.  Scores were discussed in detail, with specific attention to Clyde indicating several days of thoughts that he would be better off dead or of hurting himself.  He denied specific intent or plan to hurt himself and described these as chronic thoughts he has had since high school.  Assessed access to lethal means; he does not own a firearm or have easy access to one.   Discussed safety; if thoughts were to escalate he stated he would reach out to his sister.  Offered crisis resources and he declined.  Information was texted to patient, regardless.     He stated he has a long history of anger out bursts and during these times he has had thoughts of hurting others, but he has never acted on this.  Clyde stated he has had these outbursts since childhood, and has learned to use self-talk or distraction to calm himself when needed.  He has been in therapy for this in the past (at [facility]), which he described as forced.  Discussed therapeutic modalities that may be helpful for him in finding relief from this, offered alternate ways for him to think about his outbursts and encouraged him to reconsider his ability to mitigate his strong emotions and the choice he has in managing this.  Plan to check in in one-week to see  "if he is more open to ongoing discussion.    The patient reported a history of the following mental health concerns and/or diagnoses: anger issues, anxiety and ADHD.  Encouraged him to get re-established with PCP.     He stated he has a longstanding history of gambling, and losing large amounts of money.  He has been better at setting limits around these habit as he has recognized this as a major contributor to angry outbursts.    Pain Management Strategies:   Clyde states he uses self-talk, reframing, and marijuana to manage his pain.  He smokes at least once a day. He stated he will often remind himself that \"pain is temporary.\"    Abuse Concerns:  No concerns indicated during today's visit.    Education: Has taken some college courses at [employer].    Employment: Worked until November 2018 at [employer] in the  center.  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    School/Work Attendance:   Clyde has missed zero days of work in the past year due to his bleeding disorder.    Financial/Income:  Savings, parental support, gambling winnings.  He denied financial concerns today.    Health Insurance:  [employer] through his father's employer.    Health Literacy/Adjustment to Illness:  In recent years, Clyde has not followed regularly with the hemophilia treatment center, and he has shared difficulty in managing his bleeding disorder as he hadn't want to acknowledge that he has the condition.  He is not proficient in self-infusion and has had to rely on family members or friends for assistance when needed.  Clyde shared openly about mental health challenges and tendencies, and expressed intent to consider seeking support via PCP evaluation or potentially therapy.  LICSW to follow-up in one week with patient.    Medicalert:  Does not have, will not wear.    Advanced Care Planning:  None.    Authorization to discuss PHI:  On file to talk with mom, dad, sister. He confirms " "this is up to date.  Electronic Consent for email: On file, up to date.  Electronic Consent for text messaging:  On file, up to date.    Interventions Utilized:  Assessment of strengths and needs  Assessment of impact of living with a chronic health condition, overall adjustment, and current coping skills  Explored aspects of family history and dynamics   Explored and processed emotional/social responses to bleeding disorder and treatment plan.  Non-medical psychosocial stressors discussed  Provided positive feedback and encouragement  Encouraged ongoing self-care and continued attention to self-care  Discussed Applicable Community Resources:  Resources to access supports around housing/shelter, mental health supports  Case Coordination with CBCD team.    Impressions/Recommendations:    Clyde Becerra is a 21 year old male who presented to the [facility] at [facility] this date for a routine comprehensive clinic evaluation.  He presented today as alert, oriented and engaged and they were agreeable to and welcoming of social work visit.    In recent years, Clyde has not followed regularly with the hemophilia treatment center, and he has shared difficulty in managing his bleeding disorder as he hadn't want to acknowledge that he has the condition.  He is not proficient in self-infusion and has had to rely on family members or friends for assistance when needed.      Clyde is currently living in his vehicle.  He stated he can stay at his sister's house in [place] or with friends, but has difficulty asking for help so only stays there sporatically.  He can stay at his parents's home with the condition that he participate in therapy; he has not had contact with them for months.    Offered resources to locate more permanent housing, including single adult shelter with services in [place], but patient stated \"I'm not doing that.\"  He does have concerns about safety while living in " his vehicle so does not sleep well but declined offers for assistance, stating he plans to save money and work on this on his own.    He showers at the gym ([employer], open 24/7), does laundry at his sister's house, and if he needs new clothes or other items, his sister will coordinate with his parents on his behalf.  He stated he has enough in savings and winnings from gambling to get food.    Clyde shared openly about mental health challenges and tendencies, and expressed intent to consider seeking support via PCP evaluation or potentially therapy.  He deniedLICSW to follow-up in one week with patient.    Scores were discussed in detail, with specific attention to Clyde indicating several days of thoughts that he would be better off dead or of hurting himself.  He denied specific intent or plan to hurt himself and described these as chronic thoughts he has had since high school.  Assessed access to lethal means; he does not own a firearm or have easy access to one.   Discussed safety; if thoughts were to escalate he stated he would reach out to his sister.  Offered crisis resources and he declined.  Information was texted to patient, regardless.     He stated he has a long history of anger out bursts and during these times he has had thoughts of hurting others, but he has never acted on this.  Clyde stated he has had these outbursts since childhood, and has learned to use self-talk or distraction to calm himself when needed.  He has been in therapy for this in the past (at [facility]), which he described as forced.  Discussed therapeutic modalities that may be helpful for him in finding relief from this, offered alternate ways for him to think about his outbursts and encouraged him to reconsider his ability to mitigate his strong emotions and the choice he has in managing this.  Plan to check in in one-week to see if he is more open to ongoing discussion.    Plan:   LICSW to check in with patient  in one week.  Writer remains available to assist as needs arise, and Clyde was encouraged to contact writer as needed.    Nellie Kramer MSW, LICSW, ACM  Clinical   [facility]  Phone: [phone]

## 2022-03-01 NOTE — PROGRESS NOTES
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  Today he reports he continues to have concerns about his right wrist.  He also reports generalized back pain.      Bleeds: No written records, reports only 1 dose of factor for MSK issue in the past year with a right wrist bleed in Mar 2021.     Work: Not currently working.   Exercise/physical activity: he reports he enjoys frisbee golf, and played baseball and basketball when he was in school.      Equipment: None at time time.    Orthopedic past medical history:   None    Pain:  Chief complaint: Right wrist discomfort and feeling of stiffness, pain with palpation of abductor pollicis tendon.  He also reports diffuse generalized back pain that he fully contributes to sleeping in his car.      Fall Risk Screen:   Has the patient fallen 2 or more times in the last year? No  Has the patient fallen and had an injury in the past year? No  Timed Up and Go Score: NA  Is the patient a fall risk? No    Physical Exam: Clyde is independent with all mobility throughout clinic, including in/out of chairs, sit to/from supine on treatment table.  Gait is independent with all components WNL.    Posture: Forward head/shoulders in sit with minimal core activation.    Muscle length: Clyde has very tight hamstrings bilat, 90/90 length test: R=70,  L=70    ROM L elbow R elbow L Knee R Knee L Ankle R Ankle Other: Other:    Flex (DF) 150 150 152 152 16 15     Ext (PF) -5 0 0 0 42 44     Pronation           Supination             Hemophilia Joint Health Score 2.1 - Summary Score Sheet    2006, International Prophylaxis Study Group   Left Elbow Right Elbow Left Knee Right Knee Left Ankle Right Ankle   Swelling 0 0 1 0 0 0   Duration (swelling) 0 0 1 0 0 0   Muscle Atrophy 0 0 0 0 0 0   Crepitus on motion 0 0 0 0 0 0   Flexion Loss 0 0 0 0 0 0   Extension Loss 1 0 0 0 0 0   Joint Pain 0 0 0 0 0 0   Strength 0 0 0 0 0 0   Joint Total 1 0 2 0 0 0   NE =  Non-Evaluable  Swellin = No swelling, 1 = Mild, 2 = Moderate, 3 = Severe  Duration: 0 = No swelling or < 6 months, 1 = >/= 6 months  Muscle Atrophy:  0 = None, 1 = Mild, 2 = Severe  Crepitus on Motion:  0 = None,1 = Mild, 2 = Severe  Flexion Loss Contralateral: 0 = <5 degrees, 1 = 5-10 degrees, 2 = 11-20 degrees, 3 = > 20 degrees  Extension Loss Contralateral: 0 = <5 degrees, 1 = 5-10 degrees, 2 = 11-20 degrees, 3 = > 20 degrees  Joint Pain: 0 = No pain through active ROM, 1 = No pain through active range, only pain on overpressure or palpation, 2 = Pain through active range  Strength, Within Available Range:  0 = Holds test position against gravity with max resistance (gr 5), 1 = Holds test position against gravity with mod resistance, but breaks with maximal resistance (gr 4), 2 = Holds test position with min resistance (gr 3+) or holds test position against gravity (gr 3), 3 = Able to partially complete ROM against gravity (gr 3-/2+), or able to move through ROM gravity eliminated (gr 2), or through partial ROM gravity eliminated (gr 2-), 4 = Trace (gr 1) or no muscle contraction (gr 0)      Assessment: Clyde is found to have poor postural control and tight hamstrings.  He is experiencing back pain associated with sleeping in his car without postural support.  Right wrist symptoms consistent with tendonitis.   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     Treatment diagnosis: Limited knowledge of condition and / or self care - inability to control symptoms, Impaired posture / muscle imbalance, Muscle flexibility limitations    Clinical Presentation: Evolving/Changing  Clinical Presentation Rationale: Right wrist, back pain  Clinical Decision Making (Complexity): Low complexity  Treatment:  Clyde will benefit from skilled physical therapy to address identified impairments and for home management of bleeding disorder.  Treatment consisting of:   -therapeutic activities: Supported sleeping postures including use of available items such as clothing to create appropriate size pillow, trunk support and leg support in neutral spinal position.  Use of issued heat packs as needed for wrist discomfort until appointment and treatment plan developed with hand therapy.  Instructed in and completed reps of supine active HS stretch and doorway stretch.  Instructed in pacing with incorporation of short intervals of activity,  frequent position changes, rest breaks and restorative positioning throughout the day.   -manual therapy: right wrist Grade I and II mobilizations  Equipment issued: Disposable heat packs x 4  Plan of Care:   1. Referral made to hand therapy to address right wrist concerns.  Plan to follow up with patient in 1 week via phone to confirm if appointment was successfully made.    2. Clyde to incorporate self care education including restorative sleeping positions, use of heat and pacing activities, as well as posterior leg muscle stretches.     Therapy Frequency and Predicted Duration of Therapy Intervention: One session evaluation and treatment  Goals: Patient verbalizes understanding of evaluation results, home management and home exercise recommendations provided today to maximize functional mobility and allow for continued safe participation in  current home and work activities. -Goal Met  Recommendations: Patient referred to hand therapy.   Educational assessment/Barriers to learning: No barriers noted  Treatment provided this date (including minutes): Therapeutic Activities: 15   Patient/Family Education:Adjunctive treatment/RICE, Home exercise program: Written and verbal instruction in the following: HS stretches. and postural corrections and support.    Risks and benefits of evaluation/treatment have been explained.  Patient, family and/or caregiver are in agreement with Plan of Care.     Timed Code Treatment Minutes: 15  Total Treatment Time (sum of timed and untimed services): 50    Signature: Hailey Hall [facility]  Physical Therapist  [facility]  [phone]    Date: 3/1/2022

## 2022-03-01 NOTE — PROGRESS NOTES
Clyde Becerra  [id number]  2000    Teaching Flowsheet   Clyde Becerra  has a diagnosis of hemophilia A with a baseline factor VIII level of  1-2% . Clyde Becerra  presents today for his comprehensive Hemophilia clinic evaluation.    Teaching Topic: annual review     Learning Assessment  Person(s) involved in teaching:   Patient     Motivation Level:  Asks Questions: Yes  Eager to Learn: Yes  Cooperative: Yes  Receptive (willing/able to accept information): Yes       Patient demonstrates understanding of the following:  Reason for the appointment, diagnosis and treatment plan: Yes  Knowledge of proper use of medications and conditions for which they are ordered (with special attention to potential side effects or drug interactions): Yes  Which situations necessitate calling provider and whom to contact: Yes    Pain management techniques: Yes  How and/when to access community resources: Yes     Nursing Summary  Clyde Becerra uses n/a brand factor VIII concentrate  and treats on demand.  He treats himself using clinically administered peripheral venipuncture.    Patient's goal for today's visit is: re-establish care        Other Health Maintenance  Patient does not have a primary care provider and was encouraged to consider one  Patient does not have a dentist- was encouraged to get an appointment    Nursing Education Provided:  - Reviewed procedure for home infusions of factor concentrates.  See attached treatment recommendations.   - Reviewed severe/life threatening hemorrhage and handout given that recommends appropriate dosing.  If suspect bleeding in head, neck, throat or abdomen, give dose of factor if able, contact the hemophilia center immediately or report to the Emergency Room at [facility] or the nearest emergency room.   - Discussed need for dental check-ups and prophylactic antibiotics for dental procedures.   - Reviewed resources/weblinks  with patient.    The National Hemophilia Foundation (HANDI educational resource request)   Www.hemophilia.org    The World Federation of Hemophilia (Find a treatment center)   Www.wf.org  -Patient was given [facility] Contact Card.

## 2022-03-01 NOTE — PATIENT INSTRUCTIONS
"If on prophy treatment with factor, call with any breakthrough bleeding for possible dose adjustment.  If only treating for bleeds, please call if more than 2 bleeds in one joint in 3 month period.    For emergency head trauma, please treat with at least 50 units/kg to boost your factor level to 100%.  Please seek emergency care for head CT scan.        Patient Education & Resources:    If you would like further information about your bleeding disorder, the following links may be of help:    The National Hemophilia Foundation (includes HANDI educational resource link)   Www.hemophilia.org    The World Federation of Hemophilia (includes Global Treatment [place] Directory)   Www.wf.org    Carry factor concentrate with you at all times. Call the center if you will be traveling out of the area. We can create a travel letter prior to your departure . For treatment centers around the world go to www.wf.org, click on top right link \"RESOURCES\" and then scroll down to  \"Find a Treatment [place]\". Enter country, then scroll down to city closest to destination.    Wear medic alert on your person for highest level of safety www.medicalert.org    See dentist every 6 months. Call the center for recommendations for dental visits that involve more than an examination and cleaning. If you have had a joint replacement or port for infusions, please call for antibiotic recommendations for dental cleaning.    See primary care provider for general health maintenance.    For any questions, your nurse clinician is Caitlyn Zhang,  MSN, RN, [phone].  If Caitlyn is unavailable and you have immediate concerns, please call [phone] and ask for a nurse.     If you have emergency needs in the evening or weekend, please call [phone] and ask for the hematologist on call or Dr. Pete Osei.    Please return for comprehensive clinic in 1 year.  "

## 2022-03-02 LAB — FACT VIII ACT/NOR PPP: 2 % (ref 55–200)

## 2022-03-08 ENCOUNTER — TELEPHONE (OUTPATIENT)
Dept: HEMATOLOGY | Facility: CLINIC | Age: 22
End: 2022-03-08
Payer: COMMERCIAL

## 2022-03-08 NOTE — TELEPHONE ENCOUNTER
[facility]    Outreach to patient today by text as planned in last week's comp clinic visit.  Pt replied and indicated he is not ready for additional support around mental health.  Support, encouragement and education was provided.    ULICES Raines, LICSW, ACM  Clinical   [facility] for Ble[facility]ot[facility] Disorders  Phone: [phone]

## 2022-03-21 ENCOUNTER — TELEPHONE (OUTPATIENT)
Dept: PHYSICAL THERAPY | Facility: CLINIC | Age: 22
End: 2022-03-21
Payer: COMMERCIAL

## 2022-03-21 NOTE — TELEPHONE ENCOUNTER
Clyde was seen on 3/1/2022 for a comp clinic visit and recommended at that visit to see hand therapy for ongoing right wrist concerns.  Provider made internal referral through [facility].  Confirmed with hand therapy clinic on 3/7/22 that referral was received and attempts had been made to contact patient, unable to leave voicemail.  This writer has also attempted to contact patient via phone multiple times, voicemail box full, unable to leave message.  On one attempt the phone was answered but hung up after introduction of who was calling.    Will await contact from patient at this time if he wishes to pursue hand therapy.  Referral is in system and he can contact them directly at [phone].    Hailey Hall [facility]  Physical Therapist  [facility]  [phone]

## 2022-04-18 ENCOUNTER — TELEPHONE (OUTPATIENT)
Dept: HEMATOLOGY | Facility: CLINIC | Age: 22
End: 2022-04-18
Payer: COMMERCIAL

## 2022-04-18 ENCOUNTER — OFFICE VISIT (OUTPATIENT)
Dept: HEMATOLOGY | Facility: CLINIC | Age: 22
End: 2022-04-18
Attending: PHYSICIAN ASSISTANT
Payer: COMMERCIAL

## 2022-04-18 VITALS
TEMPERATURE: 97.8 F | OXYGEN SATURATION: 97 % | RESPIRATION RATE: 16 BRPM | BODY MASS INDEX: 24.37 KG/M2 | HEIGHT: 72 IN | SYSTOLIC BLOOD PRESSURE: 132 MMHG | WEIGHT: 179.9 LBS | HEART RATE: 63 BPM | DIASTOLIC BLOOD PRESSURE: 70 MMHG

## 2022-04-18 DIAGNOSIS — M25.531 RIGHT WRIST PAIN: ICD-10-CM

## 2022-04-18 DIAGNOSIS — D66 HEMOPHILIA A (H): Primary | ICD-10-CM

## 2022-04-18 PROCEDURE — G0463 HOSPITAL OUTPT CLINIC VISIT: HCPCS

## 2022-04-18 PROCEDURE — 99214 OFFICE O/P EST MOD 30 MIN: CPT | Performed by: PHYSICIAN ASSISTANT

## 2022-04-18 RX ORDER — CELECOXIB 200 MG/1
200 CAPSULE ORAL DAILY
Qty: 30 CAPSULE | Refills: 6 | Status: SHIPPED | OUTPATIENT
Start: 2022-04-18

## 2022-04-18 ASSESSMENT — PAIN SCALES - GENERAL: PAINLEVEL: MODERATE PAIN (4)

## 2022-04-18 NOTE — TELEPHONE ENCOUNTER
"[facility]  Brief Social Work Note    Received the following message from patient over the weekend:  \"My wrist has been flared up recently to the point where I can't even turn my apartment key in my door with it, wonder if there's any appointment availabilities this week to come get my factor or something\"    Discussed with team and texted patient back that he can be seen as soon as today if he'd like and to text back to coordinate.  Latosha Calixto PA-C has availbility on her schedule.    Nellie Kramer, ULICES, LICSW, ACM  Clinical   [facility] for Bleeding and Clotting Disorders  Phone: [phone]    Addendum:  Pt replied to text and message was sent to  to schedule him for 230pm today.    "

## 2022-04-18 NOTE — PROGRESS NOTES
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 No history of factor VIII inhibitor.    Due to ongoing left knee pain, Clyde was on prophylactic rfVIII regimen [id number] (thus he has had >50 lifetime factor exposures). 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 Has been on on-demand rFVIII since that time, but has had very few bleeding events.    Clyde has no history of hemophilic arthropathy.     Clyde does however have ongoing pain and stiffness in his right wrist as a result of repeated tendon/soft tissue injuries. He had a XR of this wrist in 2022, which was normal. He was evaluated by our physical therapist at his last comprehensive clinic visit on 2022. There was concern for tendonitis. Conservative therapies with heat packs and RICE were recommended. A referral for hand therapy was placed.    Interval History:  Patient is here today with chronic right wrist stiffness/range of motion loss. He reports that he recently moved into a new apartment on , and noticed that he had a harder time lifting/moving items due to the decreased ROM in his right wrist. He also notes that he has trouble turning his key in the door due to decreased ROM in the right wrist. He doesn't feel like things are necessarily worsening from his baseline, but he is wanting to get this addressed as his softball season is coming up. He also plays  frisbee golf.     He had not yet followed up with hand therapy. He has tried heat/ice and compression, but did not find these things very helpful. He has not been taking any anti-inflammatories.     He denies any acute changes including pain, swelling, erythema, and/or warmth.    He is open to trying Celebrex. He is open to scheduling with hand therapy.     ROS:   Otherwise negative except as stated above..     Social History:  Recently moved into a new apartment.    Objectives:  /70   Pulse 63   Temp 97.8  F (36.6  C) (Oral)   Resp 16   Ht 1.829 m (6')   Wt 81.6 kg (179 lb 14.4 oz)   SpO2 97%   BMI 24.40 kg/m      Exam:  Constitutional: healthy, alert and no distress  Head: Normocephalic. No masses, lesions, tenderness or abnormalities  Musculoskeletal: Right wrist is without swelling, erythema, or warmth. No pain with palpation or passive ROM. No overlying skin changes/bruising.  Neurologic: A&Ox3  Psychiatric: mentation appears normal and affect normal/bright    Imaging:  X-ray of the right wrist 02/18/22:   1. No acute osseous abnormality.  2. No substantial degenerative change.    Assessment/Plan:  #Moderate hemophilia A (BL 1-2%)  #Chronic stiffness/range of motion loss in right wrist   Based on history and physical, I have low suspicion for an acute joint bleed today. His symptoms are suggestive of a chronic issue, likely due to his history of repeated tendon/soft tissue injuries We discussed conservative therapies. Recommended RICE, Celebrex, and follow-up with hand therapy--which was scheduled in clinic today.       The patient is given our center's contact information and is instructed to call if he should have any further questions or concerns. Patient understands and agrees with the above plan and recommendation.    20 minutes spent on the date of the encounter doing chart review, history and exam, documentation and further activities per the note.           Latosha Calixto PA-C, ADAM RATLIFF  [facility]  [facility]  [facility] [address], [address], [place] [address]  Main: [phone], Fax: [phone]

## 2022-04-18 NOTE — PATIENT INSTRUCTIONS
Your wrist therapy has been scheduled.     Location: [address]  [address]  [facility] [address]    1hr eval appt. Tuesday May 3rd 10am, 0945am arrival     30 min treatment appt. Tuesday May 10th and 17th at 10am    Please wear a mask. You can bring up to 1 guest.    Kaya BAUTISTAN, RN, PHN   [facility]   Office: [phone]  Fax: [phone]

## 2022-04-19 ENCOUNTER — TELEPHONE (OUTPATIENT)
Dept: HEMATOLOGY | Facility: CLINIC | Age: 22
End: 2022-04-19
Payer: COMMERCIAL

## 2022-04-19 NOTE — TELEPHONE ENCOUNTER
[facility]    The patient reached out with a question related to alcohol use and celebrex. Informed PA who met with patient yesterday who will place call to patient to address.  Let patient know to expect this call.    ULICES Raines, [facility] Medical CenterSW, ACM  Clinical   [facility] for Ble[facility] and Clotting Disorders  Phone: [phone]

## 2022-05-03 ENCOUNTER — THERAPY VISIT (OUTPATIENT)
Dept: OCCUPATIONAL THERAPY | Facility: CLINIC | Age: 22
End: 2022-05-03
Attending: PHYSICIAN ASSISTANT
Payer: COMMERCIAL

## 2022-05-03 DIAGNOSIS — D66 HEMOPHILIA A (H): ICD-10-CM

## 2022-05-03 DIAGNOSIS — M25.531 RIGHT WRIST PAIN: Primary | ICD-10-CM

## 2022-05-03 PROCEDURE — 97110 THERAPEUTIC EXERCISES: CPT | Mod: GO | Performed by: OCCUPATIONAL THERAPIST

## 2022-05-03 PROCEDURE — 97166 OT EVAL MOD COMPLEX 45 MIN: CPT | Mod: GO | Performed by: OCCUPATIONAL THERAPIST

## 2022-05-03 NOTE — PROGRESS NOTES
Hand Therapy Initial Evaluation    Current Date:  5/3/2022    Subjective:  Clyde Becerra is a 21 year old right hand dominant male.    Diagnosis: Right wrist pain and stiffness   DOI:  3/1/2022 (therapy referral)    Precautions:  Hemophilia     Patient reports symptoms of pain, stiffness/loss of motion, weakness/loss of strength and edema of the right wrist which occurred due to gradual onset over the 9-10 months. Since onset symptoms are gradually getting better in the past 2 weeks with taking celebrex.  Special tests:  x-ray negative.  Previous treatment: heat and ice.  General health as reported by patient is fair.  Pertinent medical history includes:Concussions/Dizziness, Depression, Mental Illness, Smoking, Cold/Hot Extremity, Pain at Night/Rest, Severe Dizziness, Significant Weakness, hemophilia.  Medical allergies:see list in EMR.  Surgical history: other: port.  Medication history: Anti-inflammatory.    Occupational Profile Information:  Current occupation is  Student  Home Tasks: Driving, Prolonged Sitting  Prior functional level:  no limitations  Barriers include:none  Mobility: No difficulty  Transportation: drives  Leisure activities/hobbies: softball, frisbee golf, pickle ball    Functional Outcome Measure:  Upper Extremity Functional Index  SCORE:   Column Totals: 51/80  (A lower score indicates greater disability.)    Objective:  Pain Level (Scale 0-10)   5/3/2022   At Rest 3   With Use 7     Pain Description  Date 5/3/2022   Location Dorsal wrist and into forearm   Pain Quality Aching, Sharp and Throbbing   Frequency intermittent sharp pain, constant ache   Pain is worst  daytime   Exacerbated by  lifting, batting, throwing   Relieved by cold, heat and NSAIDs   Progression Gradually improving past 2 weeks with NSAID's     Sensation:  WNL throughout all nerve distributions; per patient report    Edema  Mild dorsal wrist     ROM  Wrist 5/3/2022 5/3/2022   AROM (PROM) Left Right   Extension 80  60   Flexion 85 65   RD 20 15   UD 35 25   Supination 75 60   Pronation 85 75     Strength   (Measured in pounds)  Pain Report: - none  + mild    ++ moderate    +++ severe    5/3/2022 5/3/2022   Trials Left Right   1  2  3 72  70  78 65++  50++  62++   Average 73 59     Lat Pinch 5/3/2022 5/3/2022   Trials Left Right   1  2  3 16  20  22 21-  20-  20+   Average 19 20     3 Pt Pinch 5/3/2022 5/3/2022   Trials Left Right   1  2  3 16  19  18 18+  16+  16+   Average 18 17     Assessment:  Patient presents with symptoms consistent with diagnosis of right wrist pain and stiffness, with conservative intervention.     Patient's limitations or Problem List includes:  Pain, Decreased ROM/motion, Weakness, Decreased  and Decreased pinch of the right wrist which interferes with the patient's ability to perform Self Care Tasks (dressing, eating), Work Tasks, Recreational Activities, Household Chores and Driving  as compared to previous level of function.    Rehab Potential:  Good - Return to full activity, some limitations    Patient will benefit from skilled Occupational Therapy to increase ROM, flexibility, overall strength,  strength and pinch strength and decrease pain to return to previous activity level and resume normal daily tasks and to reach their rehab potential.    Barriers to Learning:  No barrier    Communication Issues:  Patient appears to be able to clearly communicate and understand verbal and written communication and follow directions correctly.    Chart Review: Chart Review and Simple history review with patient    Identified Performance Deficits: dressing, driving and community mobility, home establishment and management, meal preparation and cleanup, work and leisure activities    Assessment of Occupational Performance:  5 or more Performance Deficits    Clinical Decision Making (Complexity): Moderate complexity    Treatment Explanation:  The following has been discussed with the patient:  RX  ordered/plan of care  Anticipated outcomes  Possible risks and side effects    Plan:  Frequency:  1 X week, once daily  Duration:  for 2 months    Treatment Plan:    Modalities:    US and Paraffin   Therapeutic Exercise:    AROM, AAROM, PROM, Tendon Gliding, Isotonics, Isometrics and Stabilization  Neuromuscular re-ed:   Kinesiotaping  Manual Techniques:   Joint mobilization, Friction massage and Myofascial release  Orthotic Fabrication:    Static Forearm based  Self Care:    Self Care Tasks    Discharge Plan:    Achieve all LTG.  Independent in home treatment program.  Reach maximal therapeutic benefit.    Home Program:   Warmth for stiffness  Ice for pain or swelling after activity  Gentle AROM of wrist and forearm all planes  Gentle AA/PROM for wrist E/F and S/P  Avoid being overzealous with HEP and stretching     Next Visit:  See in 1 week  Assess response to HEP  Progress to  and wrist strengthening as indicated  Consider joint mobs to facilitate wrist ROM

## 2022-05-11 ENCOUNTER — THERAPY VISIT (OUTPATIENT)
Dept: OCCUPATIONAL THERAPY | Facility: CLINIC | Age: 22
End: 2022-05-11
Payer: COMMERCIAL

## 2022-05-11 DIAGNOSIS — M25.531 RIGHT WRIST PAIN: ICD-10-CM

## 2022-05-11 DIAGNOSIS — D66 HEMOPHILIA A (H): Primary | ICD-10-CM

## 2022-05-11 PROCEDURE — 97140 MANUAL THERAPY 1/> REGIONS: CPT | Mod: GO | Performed by: OCCUPATIONAL THERAPIST

## 2022-05-11 PROCEDURE — 97110 THERAPEUTIC EXERCISES: CPT | Mod: GO | Performed by: OCCUPATIONAL THERAPIST

## 2022-05-11 NOTE — PROGRESS NOTES
SOAP note objective information for 5/11/2022:    Diagnosis: Right wrist pain and stiffness   DOI:  3/1/2022 (therapy referral)    Pain Level (Scale 0-10)   5/3/2022 5/11/2022   At Rest 3    With Use 7 3     ROM  Wrist 5/3/2022 5/3/2022 5/11/2022   AROM (PROM) Left Right Right   Extension 80 60 70   Flexion 85 65 75   RD 20 15 20   UD 35 25 30   Supination 75 60 70   Pronation 85 75 80     Home Program:   Warmth for stiffness  Ice for pain or swelling after activity  Gentle AROM of wrist and forearm all planes  Gentle AA/PROM for wrist E/F and S/P   strengthening  Wrist E/F and R/U isometrics   Avoid being overzealous with HEP and stretching     Next Visit:  See in 1 week  Assess response to  and wrist isometric strengthening   Continue joint mobs to facilitate wrist ROM    Please refer to the daily flowsheet for treatment today, total treatment time and time spent performing 1:1 timed codes.

## 2022-05-17 ENCOUNTER — THERAPY VISIT (OUTPATIENT)
Dept: OCCUPATIONAL THERAPY | Facility: CLINIC | Age: 22
End: 2022-05-17
Payer: COMMERCIAL

## 2022-05-17 DIAGNOSIS — D66 HEMOPHILIA A (H): Primary | ICD-10-CM

## 2022-05-17 DIAGNOSIS — M25.531 RIGHT WRIST PAIN: ICD-10-CM

## 2022-05-17 PROCEDURE — 97140 MANUAL THERAPY 1/> REGIONS: CPT | Mod: GO | Performed by: OCCUPATIONAL THERAPIST

## 2022-05-17 PROCEDURE — 97110 THERAPEUTIC EXERCISES: CPT | Mod: GO | Performed by: OCCUPATIONAL THERAPIST

## 2022-05-17 NOTE — PROGRESS NOTES
SOAP note objective information for 5/17/2022:    Diagnosis: Right wrist pain and stiffness   DOI:  3/1/2022 (therapy referral)    Pain Level (Scale 0-10)   5/3/2022 5/11/2022   At Rest 3    With Use 7 3     ROM  Wrist 5/3/2022 5/3/2022 5/11/2022 5/17/2022   AROM (PROM) Left Right Right Right   Extension  After Tx 80 60 70 70  75   Flexion  After Tx 85 65 75 75  80   RD 20 15 20 20   UD 35 25 30 35   Supination 75 60 70 70   Pronation 85 75 80 85     Home Program:   Warmth for stiffness  Ice for pain or swelling after activity  Gentle AROM of wrist and forearm all planes  Gentle AA/PROM for wrist E/F and S/P   strengthening  Wrist E/F and R/U isometrics   Avoid being overzealous with HEP and stretching     Next Visit:  See in 2 weeks  Continue joint mobs to facilitate wrist ROM  Progress Report and UEFI  Consider discharge to HEP if doing well    Please refer to the daily flowsheet for treatment today, total treatment time and time spent performing 1:1 timed codes.

## 2022-05-31 ENCOUNTER — THERAPY VISIT (OUTPATIENT)
Dept: OCCUPATIONAL THERAPY | Facility: CLINIC | Age: 22
End: 2022-05-31
Payer: COMMERCIAL

## 2022-05-31 DIAGNOSIS — D66 HEMOPHILIA A (H): ICD-10-CM

## 2022-05-31 DIAGNOSIS — M25.531 RIGHT WRIST PAIN: Primary | ICD-10-CM

## 2022-05-31 PROCEDURE — 97110 THERAPEUTIC EXERCISES: CPT | Mod: GO | Performed by: OCCUPATIONAL THERAPIST

## 2022-05-31 PROCEDURE — 97140 MANUAL THERAPY 1/> REGIONS: CPT | Mod: GO | Performed by: OCCUPATIONAL THERAPIST

## 2022-05-31 NOTE — PROGRESS NOTES
Hand Therapy Progress/Discharge Note    Current Date:  5/31/2022    Reporting period is 5/3/2022 to 5/31/2022    Diagnosis: Right wrist pain and stiffness   DOI:  3/1/2022 (therapy referral)    Precautions:  Hemophilia     Subjective:   Subjective changes noted by patient:  I tried frisbee golf a week ago and it got a little sore but not as bad as it was before.  Functional changes noted by patient:  Improvement in Household Chores  Patient has noted adverse reaction to:  None    Functional Outcome Measure:  Upper Extremity Functional Index  SCORE:   Column Totals: 67/80  (A lower score indicates greater disability.)    Objective:  Pain Level (Scale 0-10)   5/3/2022 5/31/2022   At Rest 3 0   With Use 7 3     Pain Description  Date 5/3/2022 5/31/2022   Location Dorsal wrist and into forearm Dorsal wrist and into forearm   Pain Quality Aching, Sharp and Throbbing aching   Frequency intermittent sharp pain, constant ache intermittent   Pain is worst  daytime daytime   Exacerbated by  lifting, batting, throwing Lifting, throwing, overuse   Relieved by cold, heat and NSAIDs rest   Progression Gradually improving past 2 weeks with NSAID's Improved     Sensation:  WNL throughout all nerve distributions; per patient report    Edema  Mild dorsal wrist     ROM  Wrist 5/3/2022 5/3/2022 5/31/2022   AROM (PROM) Left Right Right   Extension 80 60 75   Flexion 85 65 80   RD 20 15 20   UD 35 25 35   Supination 75 60 75   Pronation 85 75 85     Strength   (Measured in pounds)  Pain Report: - none  + mild    ++ moderate    +++ severe    5/3/2022 5/3/2022 5/31/2022   Trials Left Right Right   1  2  3 72  70  78 65++  50++  62++ 74+  76+  74+   Average 73 59 75     Lat Pinch 5/3/2022 5/3/2022 5/31/2022   Trials Left Right Right   1  2  3 16  20  22 21-  20-  20+ 20  21  21   Average 19 20 21     3 Pt Pinch 5/3/2022 5/3/2022 5/31/2022   Trials Left Right Right   1  2  3 16  19  18 18+  16+  16+ 18  19  18   Average 18 17 18      Please refer to the daily flowsheet for treatment provided today.     Assessment:  Response to therapy has been improvement to:  ROM of Wrist:  All Planes  Strength:   and pinch  Pain:  intensity of pain is decreased    Overall Assessment:  Patient's symptoms are resolving and patient is independent in home exercise program  STG/LTG:  STGoals have been reviewed and progress or achievement has occurred;  see goal sheet for details and updates.  I have re-evaluated this patient and find that the nature, scope, duration and intensity of the therapy is appropriate for the medical condition of the patient.    Plan:  Frequency/Duration:  Discharge from Hand Therapy; continue home program.    Recommendations for Continued Therapy  Home Program:   Warmth for stiffness  Ice for pain or swelling after activity  Gentle AROM of wrist and forearm all planes  Gentle AA/PROM for wrist E/F and S/P   strengthening  Wrist E/F and R/U isometrics   Avoid being overzealous with HEP and stretching

## 2022-06-01 ENCOUNTER — TELEPHONE (OUTPATIENT)
Dept: HEMATOLOGY | Facility: CLINIC | Age: 22
End: 2022-06-01
Payer: COMMERCIAL

## 2022-06-01 ENCOUNTER — OFFICE VISIT (OUTPATIENT)
Dept: HEMATOLOGY | Facility: CLINIC | Age: 22
End: 2022-06-01
Attending: PHYSICIAN ASSISTANT
Payer: COMMERCIAL

## 2022-06-01 VITALS
HEART RATE: 59 BPM | OXYGEN SATURATION: 98 % | WEIGHT: 178.5 LBS | HEIGHT: 72 IN | RESPIRATION RATE: 16 BRPM | DIASTOLIC BLOOD PRESSURE: 68 MMHG | TEMPERATURE: 97.7 F | SYSTOLIC BLOOD PRESSURE: 108 MMHG | BODY MASS INDEX: 24.18 KG/M2

## 2022-06-01 DIAGNOSIS — S50.12XA TRAUMATIC HEMATOMA OF LEFT FOREARM, INITIAL ENCOUNTER: Primary | ICD-10-CM

## 2022-06-01 DIAGNOSIS — D66 HEMOPHILIA A (H): ICD-10-CM

## 2022-06-01 PROCEDURE — G0463 HOSPITAL OUTPT CLINIC VISIT: HCPCS

## 2022-06-01 PROCEDURE — 99213 OFFICE O/P EST LOW 20 MIN: CPT | Performed by: PHYSICIAN ASSISTANT

## 2022-06-01 ASSESSMENT — PAIN SCALES - GENERAL: PAINLEVEL: NO PAIN (0)

## 2022-06-01 NOTE — TELEPHONE ENCOUNTER
Clyde Becerra is a 21yr old male followed by the [facility] for moderate hemophilia A.    Clyde reached out by text to Nellie Kramer MSGUERDA, LICSW, ACM to inform the team that he got his by a softball last Thursday, on the back of his arm. He reports by text that he is now wondering if he should be concerned about the lingering bruise (see attached photos).    Reviewed photos with Wing Pitt PA-C and Latosha Calixto PA-C who recommend that Clyde come to clinic today for evaluation.    Call placed to Clyde who states that he has not treated with factor since the injury and still has pain in the area. Clyde agrees to come to clinic at 1200 today.      Kaya BAUTISTAN, RN, PHN   [facility]   Office: [phone]  Fax: [phone]

## 2022-06-01 NOTE — PROGRESS NOTES
[place] [facility]  [address] [place] [id number]  Main: [phone], Fax: [phone]    Patient seen at: [facility] at [address] [place] [address]    Outpatient Visit Note:    Patient: Clyde Becerra  MRN: [id number]  : 2000  SLIM: 2022    Reason for visit:  Moderate hemophilia A. Left forearm hematoma.     HPI:  Clyde is a 21 year old male with a history of moderate hemophilia A with his baseline factor VIII level of 1-2% with no history of factor VIII inhibitor, presents to clinic today after a softball hit his left forearm on 2022 and caused an area of ecchymosis.     Clyde has been on on-demand / episodic factor VIII replacement therapy. He does have an emergency dose of Advate at home but needs assistance with venipuncture and infusions.     On 2022 (6 days ago), Clyde was playing softball when the ball bounced off the ground and hit him on his left forearm (anterior surface). He recalls some pain immediately after the injury but no swelling or did not notice any significant bruising, thus Clyde decided to observe instead of proceeding with factor VIII replacement infusion. On 2022, he recalls that he did not have any significant pain over the left forearm. But then on 2022, he started to experience some pain and started to notice ecchymosis over this area but no significant swelling. He continued to decide to just observe and not proceed with factor VIII replacement. Then on 2022, he actually went to a water park and did some water slides activities. On Monday, 2022, he noticed some left elbow and shoulder pain. Then yesterday, 2022, he reports that his pain has improved and the discoloration starting to fade. Clyde finally contacted our clinic today in regard to the above incident and I have decided to see this patient in clinic for further evaluation today.     Today, he  denies any significant pain but some discomfort over the left forearm over the ecchymotic area. He reports that he has full active ROM of the left elbow and shoulder and left wrist. Denies any numbness or tingling of the left hand or fingers.     ROS:  As above. No other bleeding issues to report.     Medications, Allergies and PmHx:  All are reviewed by this writer today via electronic medical records    Social History:   From what I understand, Clyde now is living with friends at an apartment and is no longer homeless.     Family History:  Deferred.    Objective:  Vitals: /68   Pulse 59   Temp 97.7  F (36.5  C) (Oral)   Resp 16   Ht 1.829 m (6')   Wt 81 kg (178 lb 8 oz)   SpO2 98%   BMI 24.21 kg/m     Exam:   Ecchymosis over the left anterior surface of the forearm just distal to the left elbow joint. There is no distinct induration or swelling over this area on exam today. Radial pulse is present and brisk on the left. There is no swelling of the left hand or fingers. Full active ROM of the left wrist, elbow and shoulder. Palpation of the left elbow has no effusion or pain or swelling. No signs of compartment syndrome.     Left forearm anterior surface:        Assessment:  In summary, Clyde is a 21 year old male with moderate hemophilia A with his baseline factor VIII level of 1-2% with no history of factor VIII inhibitor, presents to clinic today after a softball hit his left forearm 6 days ago and now with a ecchymotic area over the left anterior surface of his forearm.     He has subcutaneous hematoma that is resolving now despite not having any factor VIII replacement therapy. There are no signs of compartment syndrome on today's exam. In fact, there is no swelling of this area but just discoloration. He is not in pain.     Diagnosis:    Moderate hemophilia A.    Resolving subcutaneous hematoma over the left forearm.     Plan:  With today's exam, I do not feel that he needs any factor VIII  replacement infusions as this left forearm hematoma is resolving and his symptoms has actually improved. Additionally, this hematoma is well organized by now despite without factor VIII replacement therapy and there are no signs of acute ongoing bleeding now. More importantly, there are no signs of compartment syndrome on today's exam.     I offer to dispense him some tubigrib but he declines as he thinks it will cause him to have more discomfort. I have instructed him to ice and elevate the area. I also have strongly encourage him to rest and skip his softball game tomorrow night. However, Clyde is very reluctant to take my advice to sit out for his softball game and defiant of playing tomorrow night.     He is instructed to call our clinic if his symptoms worsened or if he should experience significant pain or numbness or tingling of his hand or fingers. Otherwise, I expect that his ecchymosis will continue to resolve in the next several days to a couple of weeks. I do not plan to see him back for follow up specifically in regard to this hematoma at this time.     He will be due for his routine hemophilia comprehensive clinic visit in March 2023. Will see him back in the mean time as needed for acute bleeding episodes.       Wing Pitt PA-C, MPAS  Physician Assistant  [facility].     25 minutes spent on the date of the encounter doing chart review, history and exam, documentation and further activities per the note.    Time IN: 12:03pm  Time OUT: 12:20pm

## 2022-06-06 ENCOUNTER — TELEPHONE (OUTPATIENT)
Dept: HEMATOLOGY | Facility: CLINIC | Age: 22
End: 2022-06-06
Payer: COMMERCIAL

## 2022-06-06 NOTE — TELEPHONE ENCOUNTER
[facility]  Social Work Note    Patient contacted [facility] to inquire about alternate HTC options, as he was not satisfied with his clinic visit last week.  Resources and alternate options were offered and discussed.    Patient stated he planned to infuse a dose of factor that he had at his parents's house related to the same incident he was seen in clinic for last week.  Patient stated he did not want to speak with a provider or RN when offered, and did not want a clinic visit.    Follow-up made to patient the following day to inquire how his infusion went.  Await response from patient.    ULICES Raines, [facility], AC  Clinical   [facility]  Phone: [phone]

## 2022-07-15 ENCOUNTER — TELEPHONE (OUTPATIENT)
Dept: HEMATOLOGY | Facility: CLINIC | Age: 22
End: 2022-07-15

## 2022-07-15 ENCOUNTER — ALLIED HEALTH/NURSE VISIT (OUTPATIENT)
Dept: HEMATOLOGY | Facility: CLINIC | Age: 22
End: 2022-07-15
Payer: COMMERCIAL

## 2022-07-15 VITALS
BODY MASS INDEX: 24.21 KG/M2 | RESPIRATION RATE: 16 BRPM | TEMPERATURE: 97.7 F | SYSTOLIC BLOOD PRESSURE: 128 MMHG | DIASTOLIC BLOOD PRESSURE: 79 MMHG | OXYGEN SATURATION: 98 % | HEIGHT: 72 IN | HEART RATE: 71 BPM

## 2022-07-15 DIAGNOSIS — D66 HEMOPHILIA A (H): Primary | ICD-10-CM

## 2022-07-15 DIAGNOSIS — S80.02XA TRAUMATIC ECCHYMOSIS OF LEFT KNEE, INITIAL ENCOUNTER: ICD-10-CM

## 2022-07-15 PROCEDURE — G0463 HOSPITAL OUTPT CLINIC VISIT: HCPCS | Mod: 25

## 2022-07-15 PROCEDURE — 96374 THER/PROPH/DIAG INJ IV PUSH: CPT

## 2022-07-15 NOTE — NURSING NOTE
Clyde Becerra  [id number]  2000    Clyde messaged team this morning about a left knee bleed from trauma of a softball hitting that area.  Inner knee was bruised (orange-size). Bleed did not appear in joint or muscle. He had incurred the injury last evening at 7pm. It started bruising right away and did not get any larger after 11 pm. He did ice the area.      [place] and would like to get a dose of factor. Discussed with CORY Piedra who placed orders for Advate. He received 3944 units into left antecubital vein.     Mary Rahman, MSN, RN, PHN -Nurse Clinician, [facility] [phone]

## 2022-07-15 NOTE — TELEPHONE ENCOUNTER
[facility]  Brief Social Work Note    Clyde Becerra is a 21 year old male with moderate hemophilia A.  REYNASW received text from patient wondering if he has a bleed/needs factor, and requesting to be seen if possible.  DANNIELLE spoke with RN, Nany, and provided her with pt's messaging and video he sent related to a possible bleed.  Nany will discuss with providers on-site and call patient to triage and make plan.    Nellie Kramer, ULICES, [facility], [facility]  Clinical   [facility]  Phone: [phone]

## 2022-09-12 ENCOUNTER — TELEPHONE (OUTPATIENT)
Dept: HEMATOLOGY | Facility: CLINIC | Age: 22
End: 2022-09-12

## 2022-09-12 ENCOUNTER — OFFICE VISIT (OUTPATIENT)
Dept: HEMATOLOGY | Facility: CLINIC | Age: 22
End: 2022-09-12
Attending: PHYSICIAN ASSISTANT
Payer: COMMERCIAL

## 2022-09-12 VITALS
HEART RATE: 55 BPM | DIASTOLIC BLOOD PRESSURE: 80 MMHG | HEIGHT: 72 IN | RESPIRATION RATE: 12 BRPM | TEMPERATURE: 97.7 F | WEIGHT: 175 LBS | BODY MASS INDEX: 23.7 KG/M2 | OXYGEN SATURATION: 97 % | SYSTOLIC BLOOD PRESSURE: 133 MMHG

## 2022-09-12 DIAGNOSIS — D66 HEMOPHILIA A (H): ICD-10-CM

## 2022-09-12 PROCEDURE — 99213 OFFICE O/P EST LOW 20 MIN: CPT | Performed by: PHYSICIAN ASSISTANT

## 2022-09-12 PROCEDURE — G0463 HOSPITAL OUTPT CLINIC VISIT: HCPCS

## 2022-09-12 RX ORDER — ANTIHEMOPHILIC FACTOR (RECOMBINANT) 3000 (+/-)
KIT INTRAVENOUS
Qty: 4220 EACH | Refills: 0 | Status: SHIPPED | OUTPATIENT
Start: 2022-09-12 | End: 2022-11-18

## 2022-09-12 ASSESSMENT — PAIN SCALES - GENERAL: PAINLEVEL: EXTREME PAIN (9)

## 2022-09-12 NOTE — PATIENT INSTRUCTIONS
[facility]  [facility]  [facility] [address], [address], [place] [address]  Main: [phone], Fax: [phone]          PLAN:    1) 1 dose of Advate in clinic today.     2) Repeat in 24 hours (at home or in clinic).     3) Stay off your right ankle for at least 3-5 days. Avoid any strenuous activities (such as softball) for a full week.    4) Take Celebrex 200mg daily to help with the inflammation.    5) Use compression, ice, and elevation.    Please let us know if your symptoms worsen/fail to improve.

## 2022-09-12 NOTE — TELEPHONE ENCOUNTER
[place] [facility]  Brief Social Work Note    Clyde Becerra is a 22 year old male with moderate hemophilia A.  [facility] this morning asking if there are appointments available today.  He stated via text he had an ankle injury.  DANNIELLE discussed with team on morning huddle, and a 130 appointment with Latosha Calixto PA-C for today was offered.  He replied and confirmed plans to attend.    Nellie Kramer, ULICES, DANNIELLE, [facility]  Clinical   [facility] for [facility]  Phone: [phone]

## 2022-09-12 NOTE — PROGRESS NOTES
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 No history of factor VIII inhibitor.     Due to ongoing left knee pain, Clyde was on prophylactic rFVIII regimen 4251-5966 (thus he has had >50 lifetime factor exposures). 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 Has been on on-demand rFVIII since that time, but has had very few bleeding events.    He does typically keep 1-2 doses of factor on hand at home for on-demand use but does not self-infuse. When he needs factor, he either has his sister's friend (who is an RN) help with infusion or he comes to clinic for assistance with the infusion.     Clyde has no history of hemophilic arthropathy.     Interval History:  Presents today for evaluation of an acute right ankle pain.    Clyde reports that last week Thursday, 2022, while playing softball, he tweaked his right ankle on a base. He reports that though he experienced some mild-moderate pain when this happened, the pain did not persist and he did not have any ongoing pain, swelling, or erythema.    Then, on Friday, 22, he reports twisting his right ankle at home. Following that, he experienced pain when he pressed on the ankle, but not at rest or with weightbearing. He did not have any swelling or erythema.     Then on , 22 (yesterday), he attended the [employer] vs[employer] game at  [employer]  and went to the bar with some friends afterwards. He notes that throughout the game/at the bar, he started experiencing progressively worsening right ankle pain and swelling. It got to the point where he could no longer bear weight on the ankle.     He is currently using crutches to ambulate. Now, the slightest movements of his right ankle bring about pain. He denies numbness/tingling. Last night/this morning, he was treating with ice and ACE wraps. He has not yet treated with factor, but brought a dose with him today. He has been taking Tylenol for pain control.    Objectives:  /80 (BP Location: Right arm, Patient Position: Sitting, Cuff Size: Adult Regular)   Pulse 55   Temp 97.7  F (36.5  C) (Oral)   Resp 12   Ht 1.829 m (6')   Wt 79.4 kg (175 lb)   SpO2 97%   BMI 23.73 kg/m    Exam:   Constitutional: Patient presents to clinic today using crutches to ambulate. He is not bearing weight on his right ankle. His right ankle is wrapped in an ace bandage.  Right ankle: Wrapped in ACE bandage at presentation. Removal of ACE bandage reveals that the right ankle, as well as the dorsal right foot is mild-moderately edematous and warm to the touch. There is generalized pain with palpation about the right ankle. ROM is very limited due to pain. There is no overlying bruising. Sensation is normal.  Left ankle: The left ankle was examined for comparison and is normal.  Neuro: AOx3    Labs:  Component      Latest Ref Rng & Units 3/1/2022   Factor 8 Assay      55 - 200 % 2 (LL)     Assessment/Plan:  In summary, Clyde Becerra is a 22 year old man with moderate hemophilia A (baseline factor VIII level 2%) that presents today with acute right ankle pain. Based on the aforementioned interval history and my exam findings today, I am concerned for an acute right ankle bleed. Given the mechanism of injury, I have low suspicion of fracture and therefore do not think that imaging is warranted at this time. My  recommendations include the followin) Advate 50u/kg (3991 units) in clinic today.   2) Repeat above dose in 24 hours (at home or in clinic).   3) Stay off your right ankle for at least 3-5 days. Avoid any strenuous activities (such as softball) for a full week.  4) Take Celebrex 200mg daily for the next 1-2 weeks.  5) Continue with compression, ice, and elevation. Another ACE bandage was provided.   6) Clyde was instructed to update us regarding his symptoms in the coming days and to let us know if his symptoms worsen/fail to improve.    The patient understands and agrees with the above recommendations. The patient was given our center's contact information and was instructed to call if any further questions/concerns arise.    Kaya Maurer RN met with the patient in clinic today.    25 minutes spent on the date of the encounter doing chart review, history and exam, documentation and further activities per the note.           Latosha Calixto PA-C, [facility]  [facility]  [address] [place] [address], [address], [place] [address]  Main: [phone], Fax: [phone]

## 2022-09-12 NOTE — PROGRESS NOTES
This staff assisted with venipuncture for Clyde's infusion of Advate 3991 units. Access obtained in the left antecubital vein. Clyde tolerated infusion without issue and was monitored after.    Kaya BAUTISTAN, RN, PHN   [facility]   Office: [phone]  Fax: [phone]

## 2022-09-13 ENCOUNTER — ALLIED HEALTH/NURSE VISIT (OUTPATIENT)
Dept: HEMATOLOGY | Facility: CLINIC | Age: 22
End: 2022-09-13
Payer: COMMERCIAL

## 2022-09-13 ENCOUNTER — TELEPHONE (OUTPATIENT)
Dept: HEMATOLOGY | Facility: CLINIC | Age: 22
End: 2022-09-13

## 2022-09-13 DIAGNOSIS — D66 HEMOPHILIA A (H): Primary | ICD-10-CM

## 2022-09-13 PROCEDURE — 999N000103 HC STATISTIC NO CHARGE FACILITY FEE

## 2022-09-13 NOTE — PROGRESS NOTES
Clyde Becerra is a 22 yr old seen by the [facility] for moderate hemophilia A. He came to clinic yesterday for evaluation of a right ankle bleed. He returned to clinic today for assistance with venipuncture for his dose of factor that was ordered by Latosha Calixto PA-C. Clyde reports improved range of motion and decreased pain and swelling. He acknowledges needing more time to rest and heal.     This staff assisted with venipuncture for Clyde's infusion of Advate 4248 units. Access obtained in the right antecubital vein. Clyde tolerated infusion without issue and was monitored after.     Kaya BAUTISTAN, RN, PHN   [facility]   Office: [phone]  Fax: [phone]

## 2022-09-13 NOTE — TELEPHONE ENCOUNTER
"[facility]  Brief Social Work Note    Received text from Clyde today \"Woke up today with it feeling better than yesterday which is a good thing, have better movement in it but still feel it.. feel like one more dose might do the trick (hopefully).  Determining right now if I can find someone to give me a dose today otherwise I'll probably run down quick,\"  Updated provider and RN who stated it pt desires in clinic infusion, there is availbility prior to 330.  Updated patient. He stated he would be in around 120.    Nellie Kramer, ULICES, LICSW, ACM  Clinical   [facility] for [facility]ot[facility]  Phone: [phone]    "

## 2022-11-18 ENCOUNTER — TELEPHONE (OUTPATIENT)
Dept: HEMATOLOGY | Facility: CLINIC | Age: 22
End: 2022-11-18

## 2022-11-18 ENCOUNTER — OFFICE VISIT (OUTPATIENT)
Dept: HEMATOLOGY | Facility: CLINIC | Age: 22
End: 2022-11-18
Attending: PHYSICIAN ASSISTANT
Payer: COMMERCIAL

## 2022-11-18 VITALS
DIASTOLIC BLOOD PRESSURE: 77 MMHG | WEIGHT: 178.3 LBS | BODY MASS INDEX: 24.15 KG/M2 | HEART RATE: 64 BPM | OXYGEN SATURATION: 97 % | HEIGHT: 72 IN | SYSTOLIC BLOOD PRESSURE: 121 MMHG | RESPIRATION RATE: 15 BRPM | TEMPERATURE: 97.7 F

## 2022-11-18 DIAGNOSIS — D66 HEMOPHILIA A (H): Primary | ICD-10-CM

## 2022-11-18 DIAGNOSIS — D66 HEMOPHILIA A (H): ICD-10-CM

## 2022-11-18 PROCEDURE — G0463 HOSPITAL OUTPT CLINIC VISIT: HCPCS

## 2022-11-18 PROCEDURE — 99213 OFFICE O/P EST LOW 20 MIN: CPT | Performed by: PHYSICIAN ASSISTANT

## 2022-11-18 RX ORDER — ANTIHEMOPHILIC FACTOR (RECOMBINANT) 3000 (+/-)
KIT INTRAVENOUS
Qty: 8440 EACH | Refills: 0 | Status: SHIPPED | OUTPATIENT
Start: 2022-11-18 | End: 2023-08-11

## 2022-11-18 ASSESSMENT — PAIN SCALES - GENERAL: PAINLEVEL: SEVERE PAIN (6)

## 2022-11-18 NOTE — PROGRESS NOTES
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 For details regarding his hemophilia history, please refer to his most recent comprehensive hemophilia clinic note dated 2022. In summary, Clyde was diagnosed with hemophilia around 16 months of age. He was on a prophylactic rFVIII regimen 5968-5615 (age 10-12) and thus has had >50 lifetime factor exposures. Factor prophylaxis was stopped in  and he has been on an on-demand rFVIII regimen since that time. Clyde has no history of hemophilic arthropathy.      Interval History:  Presents today for evaluation of acute right ankle pain. He is the  of a basketball team and while running down the court yesterday evening, he reports that he tweaked his right ankle and felt a sudden onset of pain. He slowed down, but overall did not feel that the pain was severe enough that he needed to stop. Yesterday evening into this morning/this afternoon, he has experienced progression of this pain and now cannot walk without limping. He denies numbness/tingling. He has not used any conservative measures yet such as rest, ice, compression, elevation. He has not treated with factor yet, but brought a dose with him today.     Objectives:  /77 (BP Location: Right arm, Patient Position: Sitting, Cuff Size: Adult Regular)   Pulse 64   Temp 97.7  F (36.5  C) (Oral)   Resp 15   Ht 1.829 m (6')   Wt 80.9 kg (178 lb 4.8 oz)   SpO2 97%   BMI 24.18 kg/m    Constitutional: Well appearing.  Right ankle: +Mild edema about the right ankle. +Plantarflexion and  dorsiflexion are limited due to pain but there is minimal pain with inversion and eversion. Sensation is normal.  Left ankle: The left ankle was examined for comparison and is normal.  Neuro: AOx3.     Labs:  Component      Latest Ref Rng & Units 3/1/2022   Factor 8 Assay      55 - 200 % 2 (LL)      Assessment/Plan:  In summary, Clyde Becerra is a 22 year old man with moderate hemophilia A (baseline factor VIII level 2%) that presents today with acute right ankle pain 2/2 injury. Based on the aforementioned interval history and my exam findings today, I am concerned for an acute right ankle bleed. Given the mechanism of injury, I have low suspicion of fracture and therefore do not think that imaging is warranted at this time. My recommendations include the followin) Advate 50u/kg in clinic today.   2) If symptoms do not resolve, repeat above dose in 24 hours (patient reports that his aunt can help with infusion over the weekend).  3) Stay off right ankle for at least 3-5 days. Avoid any strenuous activities (such as basketball) for a full week.  4) Take Celebrex 200mg daily for the next 1-2 weeks.  5)  Compression, ice, and elevation.     This is Clyde's second right ankle bleed within in a 3 month period. We will need to monitor this joint closely. Highly stressed the importance of rest and Celebrex over the next week to reduce the risk of re-bleed.     The patient understands and agrees with the above recommendations. The patient was given our center's contact information and was instructed to call if any further questions/concerns arise.     Kaya Maurer RN met with the patient in clinic today.     25 minutes spent on the date of the encounter doing chart review, history and exam, documentation and further activities per the note.              Latosha Calixto PA-C, [facility]  [facility]  [address], [address],  [place] [address]  Main: [phone], Fax: [phone]

## 2022-11-18 NOTE — TELEPHONE ENCOUNTER
"[facility]  Brief Social Work Note    Clyde Becerra is a 22 year old male with moderate hemophilia A.  [facility] patient at 12:14pm  asking if there are appointments available today, \"I tweaked my ankle in the same way it was hurting before.\"    Spoke with Latosha Calixto PA-C who approved an add on appointment this afternoon.  Coordinated with patient who will come in at 1pm.  Updated  for scheduling.    ULICES Raines, [facility], [facility]   Clinical   [place] for Ble[facility] Clotting [facility]  Office: [phone]        "

## 2022-11-18 NOTE — PROGRESS NOTES
This staff assisted with IV access for infusion of 3965 units of patient supplied Advate. IV access obtained in right antecubital vein without issue. Patient was monitored for 10 minutes post infusion and left clinic in good condition with clear understanding of instructions.    Kaya BAUTISTAN, RN, N   [facility]   Office: [phone]  Fax: [phone]

## 2022-11-18 NOTE — PATIENT INSTRUCTIONS
[facility]  Center for Bleeding [facility] Clotting Disorders  [facility] [address], [address], [place] [address]  Main: [phone], Fax: [phone]        1) Advate 50u/kg in clinic today.  2) Repeat above dose in 24 hours if symptoms are not resolving.  3) Stay off your right ankle for at least 3-5 days. Avoid any strenuous activities (such as basketball) for a full week.  4) Take Celebrex 200mg daily for the next 1-2 weeks.  5) Continue with compression, ice, and elevation.

## 2022-11-21 ENCOUNTER — TELEPHONE (OUTPATIENT)
Dept: HEMATOLOGY | Facility: CLINIC | Age: 22
End: 2022-11-21

## 2022-11-21 NOTE — TELEPHONE ENCOUNTER
Call placed to Clyde today to check in on his status post right ankle injury and infusion of Advate on 11/18/22.    Clyde reports feeling well. He did not feel the need to infuse over the weekend, is continuing to avoid weight bearing on that foot and is resting. He states that he feels as he did prior to this injury.    He will reach out to the [facility] with questions/concerns prn.    Kaya BAUTISTAN, RN, PHN   [facility]   Office: [phone]  Fax: [phone]

## 2023-02-01 ENCOUNTER — TELEPHONE (OUTPATIENT)
Dept: HEMATOLOGY | Facility: CLINIC | Age: 23
End: 2023-02-01
Payer: COMMERCIAL

## 2023-02-01 NOTE — TELEPHONE ENCOUNTER
[facility]  Center for Bleeding and Clotting Disorders  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 For details regarding his hemophilia history, please refer to his most recent comprehensive hemophilia clinic note dated 03/01/2022. In summary, Clyde was diagnosed with hemophilia around 16 months of age. He was on a prophylactic rFVIII regimen 0933-8605 (age 10-12) and thus has had >50 lifetime factor exposures. Factor prophylaxis was stopped in 2010 and he has been on an on-demand rFVIII regimen since that time. Clyde has no history of hemophilic arthropathy.      Today, Clyde contacted our clinic to report left elbow pain, so I contacted Clyde via phone to discuss/triage this concern.    Approximately, 1.5 weeks ago, Clyde reports that he started noticing pain of the left elbow and forearm flexor surface that radiated down to the wrist. He was unable to pinpoint exactly where the origin of the pain was on the elbow. Pain was brought on/exacerbated by elbow extension. He also reports noticing some mild bruising over the left forearm flexor surface. He did not recall any injuries/trauma to the area. He does  and play basketball but didn't fall and doesn't recall getting hit. He reports that his left elbow/forearm pain eventually resolved on its own, but has since come back over the past several days. He now notes that the pain is also brought on by wrist-movement (both flexion and extension), turning door knobs, and setting down heavy objects. He reports that he tried a forearm brace that his father had at home, and this did seem to help some. He has not been icing, resting, or using antiinflammatories. He does not notice any significant  "swelling or color change of the elbow joint itself. Symptoms have been consistent over the past several days but not worsening. I asked him to send in some photos of the affected area, which are included below. Clyde recalls being diagnosed with \"tennis elbow\" in years past but it eventually resolved on its own. He states that his current symptoms feel very similar to when he was diagnosed with tennis elbow before.     OBJECTIVE           ASSESSMENT/PLAN:  Based on the above history, symptoms, and physical findings, I have low suspicion of hemarthrosis or ongoing soft tissue hemorrhage in Clyde. His symptoms do seem to be more consistent with lateral epicondylitis at this time, which he has been diagnosed with in the past. I advised rest/activity modification and Celebrex 200mg once daily until symptoms resolve (he does have a home supply of this). I encouraged use of the forearm brace that he has at home if this helps his symptoms. If symptoms fail to improve with these methods, I advised seeing his primary care provider to discuss other possible interventions, physical therapy, etc. Encouraged him to contact us if his symptoms continue worsen, especially if he develops elbow pain at rest, swelling, further bruising/color change, or ROM changes. Right forearm bruising above appears to be subacute and healing.         Latosha Calixto PA-C, [facility]  [facility]otting [facility]  [address], [address], [place] [address]  Main: [phone], Fax: [phone]    "

## 2023-02-01 NOTE — TELEPHONE ENCOUNTER
"[facility]  Brief Social Work Note    Received a text from Clyde today at 10:37am stating   \"I'm pretty sure I have tennis elbow, is there any concern with my Hemophilia or just a normal person injury.\" and, \"It's been for last like week and a half, but I feel like this is an injur I have had before in previous years.  It even went away for like a day for the most part about a week ago but then came back and hurt bob worse which is exactly how I remember the injury going last time I had an injury like this and I don't believe it ended up being a bleed.\"    [facility] thanking him for reaching, asking if he was hoping to schedule a visit or triage phone call.  He said staff could call whenever.    Writer coordinated with Latosha Calixto PA-C, who will call patient to triage and suggest next steps for patient.    Nellie Kramer, ULICES, LICSW, [facility]   Clinical   [place] for Bleeding and Clotting Disorders  Office: [phone]        "

## 2023-02-16 ENCOUNTER — TELEPHONE (OUTPATIENT)
Dept: HEMATOLOGY | Facility: CLINIC | Age: 23
End: 2023-02-16
Payer: COMMERCIAL

## 2023-03-09 ENCOUNTER — TELEPHONE (OUTPATIENT)
Dept: HEMATOLOGY | Facility: CLINIC | Age: 23
End: 2023-03-09
Payer: COMMERCIAL

## 2023-06-05 ENCOUNTER — TELEPHONE (OUTPATIENT)
Dept: HEMATOLOGY | Facility: CLINIC | Age: 23
End: 2023-06-05
Payer: COMMERCIAL

## 2023-08-01 ENCOUNTER — TELEPHONE (OUTPATIENT)
Dept: HEMATOLOGY | Facility: CLINIC | Age: 23
End: 2023-08-01
Payer: COMMERCIAL

## 2023-08-01 DIAGNOSIS — D66 HEMOPHILIA A (H): Primary | ICD-10-CM

## 2023-08-01 NOTE — TELEPHONE ENCOUNTER
[facility]  COMPREHENSIVE CLINIC PRE-VISIT CARE COORDINATION NOTE     NAME:  Clyde Becerra  :  2000   AGE:  23 year old    Appointment date/time:        2023 10:00 AM    Assigned RN: Kaya Maurer RN  Diagnosis:     Moderate Hemophilia A  Factor level:      Factor VIII = 2 %  Inhibitor Hx:     no history of inhibitor  Viral issues:    HIV and HCV negative, immunized against/immune to HAV and HBV  Other health issues:  ADHD, complex psychosocial picture - not securely housed at last visit  Planned procedures or surgeries:  none known  Hemophilia treatment plan:  Recombinant factor VIII (Advate) on demand  Method of logging infusions:   unknown       Last CC or Office Visit:   2022  Confirmation of appointment:      CORY haynes voicemail 23 requesting patient call  to confirm appointment.   Goals for Visit:   TBD    Psychosocial:   LICSW: on SANJUANA for this visit.    Medical Insurance:   Payer Type: Commercial  Payer Name: [employer]  Insurance Type: Primary  Coverage Type: All (Medical and Drug)    Pharmacy Insurance:   Commercial  [facility]  Copay Assistance: Yes - Advate  HTC Assistance Fund: No  PDC:  n/a (on-demand)  Pharmacokinetics: NA on demand  Pharmacy Notes: Last filled 2 doses of Advate (4248 units) 22  Pharmacy to see (Y/N): Yes     Joint issues:    R ankle: bleed 22 when tweaked on a base during softball, and again 22.  H/o recurrent L elbow pain attributed to lateral epicondylitis  Recurrent Right wrist stiffness, pain with palpation of abductor pollicis tendon, mild hypermobility in CMC and MCP joints. Wrist improvement with Hand Therapy with Nellie Torres OT 5/3-22.  Left knee chronic puffiness and bony enlargement likely secondary to h/o Abgsyfs-Bcjfpd-Uayedjmuq syndrome diagnosed by ortho in  & was resolved when re-evaluated in .   H/o generalized back pain he attributes to sleeping in  his car.  Orthopedic past medical history: None   Imaging:  right wrist radiographs 2/18/2022 No acute osseous abnormality.  No substantial degenerative change.  PT orders:  Placed by [facility] 08/01/23 PT not available day of comp visit.    Eligible studies:  Eligible for ATHNdataset and subsequent registry -labs and COVID-19 antibody testing if factor used since the last comp. visit    Genetics:  Please ask if he'd like to meet with genetics.   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 Based on factor levels, his mom is likely a carrier.

## 2023-08-07 NOTE — PROGRESS NOTES
[facility]  [facility]  [facility] [address], [address], [place] [address]  Main: [phone], Fax: [phone]    Comprehensive Hemophilia Clinic Visit        Patient: Clyde Becerra  MRN: [id number]  : 2000  SLIM: 2023  Last Comprehensive Clinic: 3/1/2022  Last Office Visit:   Subsequent/Initial Registry: Subsequent Eligible    HISTORY   Hemophilia History:  Clyde Becerra is a 23 year old male with history of moderate hemophilia A, baseline factor VIII level of 1-2% without history of inhibitor, who presents today for annual comprehensive clinic visit.     Clyde was born via elective  due to being large for gestational age.   No bleeding with circumcision. Some increased bruising following immunizations.   Was diagnosed with moderate hemophilia at ~16 months old when he was evaluated for bleeding from tongue. No family history of hemophilia.  No history of inhibitor.  DDAVP challenge   Increased factor VIII to 5%    - First factor exposure due to right ankle bleed    - MRI of left knee with mild synovitis felt to be possibly due to hemophilia but without reported recurrent knee bleeds   -  Started on factor for bleed prophylaxis with port in place due to needle phobia    - Diagnosed with Kibktmu-Gkjswn-Wpeogppxo syndrome of the left knee. Factor prophylaxis was stopped as pain was not felt to be from bleeding. Follow-up MRI of knee in 2011 showed stable mild synovitis of the left knee. Historically has not had recurrent bleeding events into the knee.     Clyde has had very few bleeding events in the last few years and has not been on prophylactic factor. Episodes are mostly traumatic and respond well to a single dose of factor. He does not self infuse.     Joint History:  No history of hemophilic arthropathy   History of mild synovitis secondary to Bdakhev-Qqirwx-Wkujswmtg syndrome, which was  diagnosed in 2010  Right wrist has been a joint of concern. Has had discomfort/decreased ROM after repeated tendon/soft tissue injuries. No evidence of hemarthropathy in this joint.     Infectious Disease History:  Hepatitis A vaccinated.    Hepatitis B vaccinated.   Hepatitis C negative.  HIV negative.    Other Pertinent Medical History:  Following birth, Clyde had slow development and macrocephaly. 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  01/2015--diagnosed with ADHD by psychologist  09/2021--peritonsilar abscess   Anxiety, managed with marijuana     Current Outpatient Medications   Medication    ADVATE 3000 units SOLR    celecoxib (CELEBREX) 200 MG capsule    fexofenadine (ALLEGRA) 60 MG tablet     Current Facility-Administered Medications   Medication    factor VIII rAHF-PFM (ADVATE) injection 3,925 Units       Allergies   Allergen Reactions    Amoxicillin Hives and Rash     might have also been from the virus itself  Hives       Nsaids Other (See Comments)     Pt has moderate hemophilia A         Family History:  Please refer to Genetics Counselor's previous note for detailed family history.      Social History:  Please refer to DANNIELLE Brown's note for detailed social history.  - Lives in apartment with friend from childhood, gets along well. No concerns.   - Girlfriend of one year who works at [employer].   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 He is thinking about different career options as he is approaching his 24th birthday next year and will need his own insurance.   - No cigarette use. Does drink alcohol socially. Uses marijuana daily. No vaping.       CURRENT TREATMENT REGIMEN  Prophylaxis: None    Breakthrough/On-Demand: Advate 3000 unit(s) as needed for bleeding episodes    He does NOT self infuse but does keept 1-2 doses of factor at home as he has family members that can infuse.     INTERVAL HISTORY  Clyde is a 23 year old male with  "moderate hemophilia A (baseline factor VIII level 1-2%). He has no history of factor VIII inhibitor. His joints are preserved as he has had very few bleeding events over the years, despite not being on prophylactic factor. He continues to treat on demand with Advate, but does not self infuse. He has an aunt and a friend's sister that help him with infusion or will come to the [facility] for infusion.      At his last appointment, his right wrist was joint of concern. XR showed no evidence of hemarthropathy. He was seen by PT and treated with conservative measures. Was started on celebrex PRN and completed hand therapy with improvement in symptoms. He does note sensation as if his right wrist \"needs to pop/crack\". He notes symptoms are exacerbated after playing frisbee golf or video games.     He notes no epistaxis, gingival bleeding (other than when flossing), hematuria or hematochezia. He does report frequent spontaneous bruising and small hematoma formation with minimal injury.     He has contacted the office several times since his last appointment and has treated with factor VIII four times since his last comprehensive visit: Details below    - 6/1/2022 he was evaluated in clinic by CORY Pitt for softball injury that occurred on 5/26/2022 with  subsequent left forearm hematoma. His hematoma was resolving thus no factor was advised. There was no concern for compartment syndrome thus he was treated with conservative measures. He did have persistent symptoms and treated with a dose of Advate.     - 7/15/2022 Left knee bleed due to trauma of softball. Received a dose of Advate 3944 unit(s) at [facility].     - 9/12/2022 Saw CORY Calixto for acute right ankle pain after sortball injury. He was treated with Advate 3991 unit(s) at [facility] and instructed to treat with conservative measures.     - 11/18/2022 Saw CORY Calixto for acute right ankle bleed and was treated with Advate 3965 unit(s) and instructed to treat with conservative " "measures.     - 1/2023 - left elbow and forearm flexor pain consistent with lateral epicondylitis. He was advised to use conservative treatment measures, Celebrex and trial a brace. No factor advised.     He currently has 2 dose of Advate at home at 4248 unit(s).       He notes that his joints have been relatively stable since last fall without any additional hemarthrosis episodes. He notes that with increased activity he will get some increased pain and swelling of the joint for about 12 hours but then symptoms subside with rest. He has not treated with factor for these episodes. He is not taking Celebrex presently but does have some at home.     He does report chronic back pain that is relieved after he has a friend \"crack\" it. He has not been to a chiropractor but is interested in it. He notes that his back pain stems from sleeping in a car or on couches for several years.     More recently he has been struggling with headaches. He has not had any head injuries. Symptoms are intermittent a few times per week. He denies any vision changes or other focal neurological changes with the episodes but did get some right sided sinus pressure associated with his latest episode. No recent illness, nasal drainage, dental concerns. He notes that he has been hydrating much more than usual - drinking 3-4 of his Brennen cups per day. He historically has had issues with electrolyte imbalance and wonders if this could be contributing. History of hypokalemia in 2019 and 2021.     He is overdue to see primary care. He last saw them about four years ago. He has lost follow up with [facility] clinic as well and wishes to get back on ADHD and psychiatric medications. He is also overdue for a dental cleaning but denies any acute concerns.     He notes that he is still living with his childhood friend in an apartment and has had a girlfriend for the past year. 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 He has been enjoying summer playing softball and spending time with family at their cabin.     He is currently on his parents insurance and understands that he will need to get his own insurance at age 24. He is considering options.     Interval events include:    Bleeding episodes in the past year: 4  Location Number of bleeds Other information related to each bleed   Ankle, Left 0     Ankle, Right 2     Elbow, Left 0     Elbow, Right 0     Hip, Left 0     Hip, Right 0     Knee, Left 1     Knee, Right 0     Shoulder, Left 0     Shoulder, Right 0     Other 1 Forearm hematoma       Activities of Daily Living Assessment: Unrestricted school or work and unrestricted recreational activities     Chronic pain: Chronic pain some days     Pain management: Celebrex    Primary care provider: Needs to establish care   Dentist: Overdue for cleaning     No ED visits/hospitalizations in the past year.    No orthopedic procedures in the past year.       ROS:  Complete ROS is negative, except as noted above.       OBJECTIVE  Exam:  BP (!) 145/84   Pulse 54   Temp 97.5  F (36.4  C) (Oral)   Ht 1.829 m (6')   Wt 80.5 kg (177 lb 6.4 oz)   SpO2 99%   BMI 24.06 kg/m    Repeat /72  General: No acute distress  Constitutional: Appears well, no distress  HEENT: Pupils equal and round. No scleral icterus or hemorrhage. Nares without evidence of telangiectasia. Mucous membranes moist with no wet purpura. Dentition overall ok with no signs of decay. No pharyngeal exudates. No lymphadenopathy, no thyromeagaly  CV: regular rate and rhythm, no murmurs  Respiratory: clear to auscultation bilaterally   GI: abdomen soft, nontender, without guarding or rebound. No hepatomegaly. No splenomegaly.   Mus/Skele: no edema, ROM of all 6 index joints preserved.   Skin: no petechiae, no ecchymosis.  Neuro: CN II-XII intact. Normal gait. Aox3.        Past Pharmacokinetic Studies:  N/A     Labs:    Factor VIII assay 3/1/22 = 2%     Lab  Results   Component Value Date    WBC 14.8 09/03/2021     Lab Results   Component Value Date    RBC 5.05 09/03/2021     Lab Results   Component Value Date    HGB 14.7 09/03/2021     Lab Results   Component Value Date    HCT 41.2 09/03/2021     Lab Results   Component Value Date    MCV 82 09/03/2021     Lab Results   Component Value Date    MCH 29.1 09/03/2021     Lab Results   Component Value Date    MCHC 35.7 09/03/2021     Lab Results   Component Value Date    RDW 11.7 09/03/2021     Lab Results   Component Value Date     09/03/2021     Last Comprehensive Metabolic Panel:  Lab Results   Component Value Date     08/08/2023    POTASSIUM 4.5 08/08/2023    CHLORIDE 102 08/08/2023    CO2 23 08/08/2023    ANIONGAP 13 08/08/2023     (H) 08/08/2023    BUN 7.2 08/08/2023    CR 0.80 08/08/2023    GFRESTIMATED >90 08/08/2023    KHOA 10.0 08/08/2023       Imaging:    X-ray of the right wrist 02/18/22:   1. No acute osseous abnormality.  2. No substantial degenerative change.    ASSESSMENT  In summary, Clyde Becerra is a 23 year old man with moderate hemophilia A, with a baseline factor VIII level of 1-2% and no history of inhibitor, who returns to clinic today for routine comprehensive hemophilia clinic visit. Clyde was on a prophylactic rFVIII regimen 8836-1719 (age 10-12) and thus has had >50 lifetime factor exposures. Factor prophylaxis was stopped in 2010 and he has been on an on-demand rFVIII regimen since that time. Clyde has no history of hemophilic arthropathy. Since his last office visit, he treated four times with factor for acute bleeding events. Joints of concern include right wrist and right ankle (two bleeds this last year). He uses celebrex as needed for pain control. He does keep factor in the home but does not self infuse.     I reinforced that if he has signs/symptoms of possible bleed to contact our office for acute triage.     He plans on re-establishing care with primary care  provider and psychiatry for management of ADHD.  He is overdue for dental cleaning.     He has been having more frequent intermittent headaches that are concerning for possible electrolyte imbalance given significant increase in his water intake vs cluster headache symptoms. No neurological symptoms or trauma concerning for ICH.     PLAN  Factor replacement plan:  Advate at 50 unit(s)/kg on demand for breakthrough bleeds. He does not self infuse.      Hemophilic arthropathy management plan:  Home exercise program   Celebrex as needed    Labs done and follow-up plan:  Registry and C-19 labs today. Will check BMP and Mag and call him with results.     Routine Health Maintenance:  Primary care annual preventative visit recommended.   Re-establish with psychiatry for ADHD and mental health medications.  Routine dental cleanings every 6 months.      Follow up clinic visit timing:  Follow up visit in 1 year, sooner if any concerns.        The following individuals have seen this patient independently today.    Kaya Maurer, nurse clinician, Tiarra Sanchezr, [facility] and a hemophilia pharmacy liaison have all seen the patient     independently, please refer to their notes for their evaluation and assessment.         Ravi Ferguson, MPH, PA-C  [facility]        Summary: Community Counts! - CDC Public Health Surveillance Results   Community Counts! - CDC Public Health Surveillance Results  Clyde Becerra, 2000, Labs from 8/8/2023 have returned from CDC Community Counts Registry. Results included:                            Factor VIII Inhibitor: 0.1 [place] units              COVID-19 Ab Testing: Negative- No evidence for past or present SARS-CoV-2 infection.                     RAVI FERGUSON PA-C on 11/14/2023 at 7:34 AM

## 2023-08-08 ENCOUNTER — OFFICE VISIT (OUTPATIENT)
Dept: HEMATOLOGY | Facility: CLINIC | Age: 23
End: 2023-08-08
Attending: INTERNAL MEDICINE
Payer: COMMERCIAL

## 2023-08-08 ENCOUNTER — DOCUMENTATION ONLY (OUTPATIENT)
Dept: HEMATOLOGY | Facility: CLINIC | Age: 23
End: 2023-08-08
Payer: COMMERCIAL

## 2023-08-08 ENCOUNTER — TRANSFERRED RECORDS (OUTPATIENT)
Dept: HEALTH INFORMATION MANAGEMENT | Facility: CLINIC | Age: 23
End: 2023-08-08

## 2023-08-08 VITALS
TEMPERATURE: 97.5 F | WEIGHT: 177.4 LBS | HEIGHT: 72 IN | DIASTOLIC BLOOD PRESSURE: 84 MMHG | BODY MASS INDEX: 24.03 KG/M2 | SYSTOLIC BLOOD PRESSURE: 145 MMHG | OXYGEN SATURATION: 99 % | HEART RATE: 54 BPM

## 2023-08-08 DIAGNOSIS — M25.531 RIGHT WRIST PAIN: ICD-10-CM

## 2023-08-08 DIAGNOSIS — D66 HEMOPHILIA A (H): Primary | ICD-10-CM

## 2023-08-08 DIAGNOSIS — G44.209 TENSION HEADACHE: ICD-10-CM

## 2023-08-08 LAB
ANION GAP SERPL CALCULATED.3IONS-SCNC: 13 MMOL/L (ref 7–15)
BUN SERPL-MCNC: 7.2 MG/DL (ref 6–20)
CALCIUM SERPL-MCNC: 10 MG/DL (ref 8.6–10)
CHLORIDE SERPL-SCNC: 102 MMOL/L (ref 98–107)
CREAT SERPL-MCNC: 0.8 MG/DL (ref 0.67–1.17)
DEPRECATED HCO3 PLAS-SCNC: 23 MMOL/L (ref 22–29)
GFR SERPL CREATININE-BSD FRML MDRD: >90 ML/MIN/1.73M2
GLUCOSE SERPL-MCNC: 103 MG/DL (ref 70–99)
MAGNESIUM SERPL-MCNC: 2.3 MG/DL (ref 1.7–2.3)
POTASSIUM SERPL-SCNC: 4.5 MMOL/L (ref 3.4–5.3)
SODIUM SERPL-SCNC: 138 MMOL/L (ref 136–145)

## 2023-08-08 PROCEDURE — 83735 ASSAY OF MAGNESIUM: CPT | Performed by: INTERNAL MEDICINE

## 2023-08-08 PROCEDURE — 36415 COLL VENOUS BLD VENIPUNCTURE: CPT | Performed by: INTERNAL MEDICINE

## 2023-08-08 PROCEDURE — 99214 OFFICE O/P EST MOD 30 MIN: CPT | Performed by: PHYSICIAN ASSISTANT

## 2023-08-08 PROCEDURE — 80048 BASIC METABOLIC PNL TOTAL CA: CPT | Performed by: INTERNAL MEDICINE

## 2023-08-08 PROCEDURE — G0463 HOSPITAL OUTPT CLINIC VISIT: HCPCS | Performed by: PHYSICIAN ASSISTANT

## 2023-08-08 NOTE — PROGRESS NOTES
I met with Clyde Becerra on August 8, 2023 during his annual comprehensive clinic visit with the [facility].    We discussed his current factor plan of Advate on demand.  He does not currently self infuse.  He either comes into clinic or he knows quite a few (family and/or friends) that are nurses and can help with infusions.  He doesn't currently log, his mom used to for him and he thinks she may still, so we talked about even using the Note section on his phone to jot things down. He has 2 doses of Advate on hand currently.  He didn't need anything else today while in clinic.  I gave him the pharmacy brochure and pointed out our phone number to make sure that is in his phone.     Does Pt currently log? no   Type of logs: -  Outcome of logging? Pt will try to keep record on his phone    We discussed the HRSA Statement and the patient fully understood.    I spent 7 minutes face to face in clinic today with Clyde Saldivar, Pharmacy Coordinator   [facility]  [phone]

## 2023-08-08 NOTE — PROGRESS NOTES
Clyde Becerra  [id number]  2000    Teaching Flowsheet   Clyde Becerra  has a diagnosis of hemophilia A with a baseline factor VIII level of  2% . Clyde Becerra  presents today for his comprehensive Hemophilia clinic evaluation.    Teaching Topic: annual visit     Learning Assessment  Person(s) involved in teaching:   Patient     Motivation Level:  Asks Questions: Yes  Eager to Learn: Yes  Cooperative: Yes  Receptive (willing/able to accept information): Yes  Comments: Clyde came today wanting to discuss his recent headaches. He's also wanting to get back on psych meds and is due to see a dentist. Clyde talked about the  need to be on his own insurance in a year and is considering what that would look like and what kind of job he might need to find.     Patient demonstrates understanding of the following:  Reason for the appointment, diagnosis and treatment plan: Yes  Knowledge of proper use of medications and conditions for which they are ordered (with special attention to potential side effects or drug interactions): Yes  Which situations necessitate calling provider and whom to contact: Yes    Pain management techniques: Yes  How and/when to access community resources: Yes     Nursing Summary  Clyde Becerra uses  Advate brand factor VIII concentrate  and treats on demand.  He treats himself using independent peripheral venipuncture and his typical dose is 4,220 units/mg. He keeps is encouraged to keep track of his infusions using the Vertro ruth.    Patient's goal for today's visit is: Annual visit    Other Health Maintenance  Patient does not have a primary care provider and was encouraged to consider one  Patient last saw dentist in 2019. He is due for a cleaning    Nursing Education Provided:  - Reviewed procedure for home infusions of factor concentrates.  See attached treatment recommendations.   - Reviewed severe/life threatening hemorrhage and handout given that  recommends appropriate dosing.  If suspect bleeding in head, neck, throat or abdomen, give dose of factor if able, contact the hemophilia center immediately or report to the Emergency Room at [facility] or the nearest emergency room.   - Discussed need for dental check-ups and prophylactic antibiotics for dental procedures.   - Reviewed resources/weblinks with patient.  The National Hemophilia Foundation (HANDI educational resource request)   Www.hemophilia.org  The World Federation of Hemophilia (Find a treatment center)   Www.wfh.org  -Patient was given [facility] Contact Card.

## 2023-08-08 NOTE — PROGRESS NOTES
8/8/2023    Presenting Information:   Clyde Becerra was seen for a comprehensive clinic visit at the [facility] today. I met with him to introduce myself, update his family history, and review the inheritance and genetics of hemophilia A.     Personal History:   Clyde is a 23 year old man with moderate hemophilia A. 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  Please see Nata Bloom, MPH, PA-C s note for details regarding his medical history.     Family History:   A three generation pedigree, specific to hemophilia was obtained previously and reviewed today. Clyde did not report any updates today.     Discussion:   We reviewed that mutations in the F8 gene that cause hemophilia A are inherited in an X linked recessive pattern. The F8 gene is located on the X chromosome. Men with hemophilia will always pass on their X chromosome to each daughter and their Y chromosome to each son. For Clyde, this means that all of his daughters will be carriers of hemophilia A, but none of his sons will have hemophilia A, unless his partner is a carrier or there is a de shahid (new) mutation in one of his children. We discussed that for women who are carriers of hemophilia with an unaffected partner, each of her daughters has a 50% chance of being a hemophilia carrier and a 50% chance of being an unaffected non-carrier. Each of her sons has a 50% chance of having hemophilia and a 50% chance of being unaffected.     We discussed that other family members may be carriers of hemophilia A.  We discussed that some carrier women have low enough factor VIII levels to cause bleeding issues. Typically these bleeding concerns are mild. We discussed that any carrier or possible carrier in his family (including his sister) can have their factor VIII level checked to assess for bleeding risk. We discussed that a normal factor level does not discount possible carrier status, as carrier women can have normal  factor levels. Clyde reported that his sister had carrier testing and that she was negative. He was fairly sure she had targeted genetic testing and not a factor VIII level assessment.     Clyde mentioned that some of his peers who have learned about the inheritance pattern of hemophilia were confused about how it could be possible that Clyde does not have any known ancestors with hemophilia. We reviewed that the de shahid rate for hemophilia A is relatively high (about 1/3 have no family history), so it is possible that Clyde is the first one in his family with hemophilia A. Since Clyde's mom is a carrier, we discussed that the familial mutation may have been de shahid in her or in her maternal ancestors.     Plan:   Clyde will meet with other members of our team today. Please see their notes independently.   Contact information was provided. Additional questions or concerns were denied.    Approximate Time Spent in Consultation: 15 minutes.     Tiarra Babcock, PhD, MS, [facility]  Licensed, Certified Genetic Counselor  [phone]    CC: No Letter

## 2023-08-08 NOTE — PATIENT INSTRUCTIONS
Emergency Treatment Recommendations    Date: 2023    Name: Clyde Becerra  MRN: [id number]     : 2000  Diagnosis:  moderate Hemophilia A  Baseline factor VIII level: 2 %  Inhibitor status: No history of inhibitor  Patient weight: 81kg  Current treatment:  Advate on demand  Half-life information:  unknown    For bleeding events:   Treat with: recombinant factor VIII 50 units/kg   Factor replacement should be administered emergently and prior to imaging or any invasive/surgical procedure   For suspected intracranial hemorrhage or life / limb threatening bleed, draw a peak factor VIII level 5-10 minutes after administration   For mucosal bleeding, antifibrinolytics may be useful; these are contraindicated in hematuria  Aminocaproic acid (Amicar) 50mg/kg IV or PO q 6 hours  Tranexamic acid (Lysteda) 1300 mg po TID or 10mg/kg IV q 8 hours     A single dose of factor replacement will temporarily correct the bleeding disorder-- further dosing may be required.  For guidance, please contact [facility]: [phone]  After Hours please call [phone] and ask for hematologist on call      Factor Plan:   Dose of factor (VIII) concentrate = 4,220 units given on demand   if on prophy treatment with factor, call with any breakthrough bleeding for possible dose adjustment.  If only treating for bleeds, please call if more than 2 bleeds in one joint in 3 month period.  For emergency head trauma, or signs of serious bleeing,  please treat with at least 50 units/kg to boost your factor level to 100%.  Please seek emergency care for head CT scan.       General Recommendations:  See primary care provider for general health maintenance. Re-establish care with your mental health provider. They are the most appropriate ones to begin prescribing your medications for ADHD and other mental health dx.  See dentist every 6 months. Call the [facility] for recommendations for dental  "visits that involve more than an examination and cleaning. If you have had a joint replacement or port for infusions, please call for antibiotic recommendations for dental cleaning.  Wear medic alert on your person for highest level of safety www.medical[facility]ar[facility].org  Carry factor concentrate with you at all times. Call the center if you will be traveling out of the area. We can create a travel letter prior to your departure . For treatment centers around the world go to [email or web address], click on top right link \"RESOURCES\" and then scroll down to  \"Find a Treatment [place]\". Enter country, then scroll down to city closest to destination.  For any questions, your nurse clinician is Kaya Maurer RN, BSN, [phone] .  If Kaya is unavailable and you have immediate concerns, please call [phone] and ask for a nurse.   If you have emergency needs in the evening or weekend, please call [phone] and ask for the hematologist on call. Please return for comprehensive clinic in 1 year.    Patient Education & Resources  If you would like further information about your bleeding disorder, the following links may be of help:  The National Hemophilia Foundation (includes HANDI educational resource link)   Www.hemophilia.org  The World Federation of Hemophilia (includes Global Treatment [place] Directory)   Www.wfh.org    Additional Patient Information  Here is the information reviewed you today regarding [facility] choice information:    Per the 2005 [employer] Manual for Participating in the Drug Pricing Program Established by Section 340B of the Public Health Service Act I. Freedom of Choice Regarding Factor Replacement Products and Avoidance of Conflict of Interest It is a requirement of the [facility] National Hemophilia Program (NHP) that all [facility] funded hemophilia treatment centers (HTCs) have a \"Freedom of Choice\" policy where patients are informed of choices they have regarding factor replacement products (FRP) and where these " products might be purchased. It is important that this policy be exercised with all patients and it is especially important that this policy be exercised by [facility] funded HTCs that sell FRP since income generated from this activity is used to further the provision of services by the [employer]. To avoid any appearance of conflict of interest, patients must be informed about their choices and be encouraged to make whatever decision they desire.

## 2023-08-09 NOTE — NURSING NOTE
"[id number]  Clyde Jan Becerra  23 year old male  [facility] Diagnosis: Hemophilia A  [facility] Provider: Nata Johnson PA-C    Incoming message from provider.    \"Nata Keita PA-C P [facility] Nurse  Please call Clyde and let him know that his labs including electrolytes were normal. If he has continued headaches, he needs to seek further evaluation by primary care provider. If any changes in vision or neurological changes, he needs to go to the ER for evaluation.  Nata Bloom PA-C\"    RN called patient and informed him of above information. No further questions or concerns from patient.    Maliha Galloway RN, BSN, PCCN  Nurse Clinician    [facility]  [address] [place] [address], [address], [place] [address]   Office, direct: [phone]  Main office number: [phone]  Pronouns: She, her, hers        "

## 2023-08-11 ENCOUNTER — OFFICE VISIT (OUTPATIENT)
Dept: HEMATOLOGY | Facility: CLINIC | Age: 23
End: 2023-08-11
Attending: PHYSICIAN ASSISTANT
Payer: COMMERCIAL

## 2023-08-11 VITALS
HEART RATE: 66 BPM | BODY MASS INDEX: 23.9 KG/M2 | OXYGEN SATURATION: 96 % | SYSTOLIC BLOOD PRESSURE: 122 MMHG | DIASTOLIC BLOOD PRESSURE: 82 MMHG | WEIGHT: 176.2 LBS | TEMPERATURE: 96.3 F

## 2023-08-11 DIAGNOSIS — D66 HEMOPHILIA A (H): ICD-10-CM

## 2023-08-11 PROCEDURE — 99213 OFFICE O/P EST LOW 20 MIN: CPT | Performed by: PHYSICIAN ASSISTANT

## 2023-08-11 PROCEDURE — G0463 HOSPITAL OUTPT CLINIC VISIT: HCPCS | Performed by: PHYSICIAN ASSISTANT

## 2023-08-11 RX ORDER — ANTIHEMOPHILIC FACTOR (RECOMBINANT) 3000 (+/-)
KIT INTRAVENOUS
Qty: 8440 EACH | Refills: 0 | Status: SHIPPED | OUTPATIENT
Start: 2023-08-11 | End: 2023-09-08

## 2023-08-11 NOTE — PROGRESS NOTES
This staff assisted with IV access to infuse Advate 4248units. Access obtained in RAC and infused without issue. Clyde was monitored post infusion and left clinic in good condition. This staff plans to call Clyde Monday to assess his condition.    Kaya BAUTISTAN, RN, PHN   [facility]   Office: [phone]  Fax: [phone]

## 2023-08-11 NOTE — PROGRESS NOTES
[facility]  Center for Bleeding and Clotting Disorders  [facility] [address], [address], [place] [address]  Main: [phone], Fax: [phone]      Outpatient Visit Note:    Patient: Clyde Becerra  MRN: [id number]  : 2000  SLIM: Aug 11, 2023    SUBJECTIVE:  Clyde Becerra is a 23 year old male that is well known to the Center [facility] for his history of moderate hemophilia A (baseline factor VIII 1-2%) without inhibitor. He is treated on-demand with recombinant factor VIII (Advate). Please refer to his most recent comprehensive hemophilia clinic visit note dated 2023 for additional details regarding his history.     He presents today urgently due to acute injury. During his softball playoff game last night, he was hit by a softball twice, once on the ventral aspect of the right forearm 2-3 inches superior to the right wrist, and once on the dorsal aspect of the of the right forearm just slightly superior to the right wrist. He has since noticed progressive difficulty with flexion, extension, and circumduction his right wrist--due to both stiffness and pain. He notes difficulty with gripping and turning his key in the door. He is right hand dominant.    OBJECTIVE:  /82 (BP Location: Right arm, Patient Position: Sitting, Cuff Size: Adult Regular)   Pulse 66   Temp (!) 96.3  F (35.7  C) (Tympanic)   Wt 79.9 kg (176 lb 3.2 oz)   SpO2 96%   BMI 23.90 kg/m    Constitutional: Pleasant. Well appearing. Not in distress.  Respiratory: Breathing comfortably on room air.  Upper extremities: The right wrist does have markedly decreased ROM. It is not swollen. There is minimal bruising/ecchymosis, only on the ventral side, as pictured below.         Wt Readings from Last 2 Encounters:   23 79.9 kg (176 lb 3.2 oz)   23 80.5 kg (177 lb 6.4 oz)     ASSESSMENT:  In summary, Clyde is a pleasant 23 year old male that has hemophilia A  (baseline factor VIII 1-2%) without inhibitor. He is presenting with symptoms of acute right wrist hemarthrosis 2/2 softball injury.    PLAN:  1) Patient's home dose of Advate 4220 units was administered in clinic today. His home supply was replenished.  2) Advised rest, ice, compression, and elevation.   3) Advised Celebrex 200mg daily for the next 7-14 days.    Clyde does have wedding this weekend but no more softball until next week. I advised sitting out of softball next week unless his symptoms were completely resolved. We will plan to follow-up with him on Monday to check in.    The patient understands and agrees with the above recommendations. The patient was given our center's contact information and was instructed to call if any further questions/concerns arise.    10 minutes spent on the date of the encounter doing chart review, history and exam, documentation and further activities per the note.           Latosha Calixto PA-C, [facility]  Center for Ble[facility] Clotting Disorders  [address], [address], [place] [address]  Main: [phone], Fax: [phone]

## 2023-08-14 ENCOUNTER — TELEPHONE (OUTPATIENT)
Dept: HEMATOLOGY | Facility: CLINIC | Age: 23
End: 2023-08-14
Payer: COMMERCIAL

## 2023-08-14 NOTE — TELEPHONE ENCOUNTER
[id number]  Clyde Becerra  23 year old male  CBCD Diagnosis: moderate hemophilia A  CBCD Provider: Dr. Osei & Latosha Calixto PA-C    Call placed to Clyde to check in and see how his wrist is. He had come to clinic on Friday August 11th for an infusion post right wrist injury at a softball game. Clyde reports that he's doing much better. He states that he has no pain at baseline. He notes some pain with weight baring or moving to test his full range of motion. He doesn't think there is swelling and reports that when comparing both wrists, they look mostly the same.    Plan made to review with Latosha Calixto PA-C and staff will call back to Clyde.    Clyde will plan to get more factor this week. Either picking up at [facility] if [facility] needs an infusion [facility].    Kaya VALLE, RN, N   [facility] for Bleeding and Clotting Disorders   Office: [phone]  Fax: [phone]     Addendum    Call back to Clyde to review Latosha's instructions. Call went to voicemail. Left message to continue to rest and use Celebrex this week. Reach out to [facility] with any worsening symptoms. Pharmacy will ship out additional doses of Advate tomorrow.    Kaya VALLE, RN, [facility] for Bleeding and Clotting Disorders   Office: [phone]  Fax: [phone]

## 2023-08-21 ENCOUNTER — TELEPHONE (OUTPATIENT)
Dept: HEMATOLOGY | Facility: CLINIC | Age: 23
End: 2023-08-21
Payer: COMMERCIAL

## 2023-08-21 NOTE — TELEPHONE ENCOUNTER
[facility] for [facility]ot[facility]  [address] [place] [address], [address] [address]  Main: [phone], Fax: [phone]        Please refer to my previous note dated 08/11/2023 and my colleague Kaya Maurer's note dated 08/14/2023 for background.   ---- ----- ----  Spoke with Clyde on the phone this morning. Overall, his right wrist is feeling better. He does not have any persistent pain, swelling, or ROM issues. However, he reports certain activities/movements (ie heavy lifting, tight gripping) can bring on some sharp pain that lasts seconds to minutes then resolves.     Has been taking Celebrex daily as previously recommended.  Has not been playing softball but has been doing normal everyday activities.  Has not been bracing but does have a brace at his parents' house.   ---- ----- ----  We discussed that his symptoms are fortunately not consistent with ongoing hemarthrosis but may be related to a ligamentous injury. I recommended using his brace and completely resting that wrist for another week. Can continue daily Celebrex and icing if this helps with symptoms.       Latosha Calixto PA-C, [facility]  Center for Bleeding and Clotting Disorders  [address] [place] [address], [address] [place] [address]  Main: [phone], Fax: [phone]

## 2023-09-08 ENCOUNTER — TRANSFERRED RECORDS (OUTPATIENT)
Dept: HEALTH INFORMATION MANAGEMENT | Facility: CLINIC | Age: 23
End: 2023-09-08

## 2023-09-08 ENCOUNTER — DOCUMENTATION ONLY (OUTPATIENT)
Dept: HEMATOLOGY | Facility: CLINIC | Age: 23
End: 2023-09-08
Payer: COMMERCIAL

## 2023-09-08 ENCOUNTER — OFFICE VISIT (OUTPATIENT)
Dept: HEMATOLOGY | Facility: CLINIC | Age: 23
End: 2023-09-08
Attending: PHYSICIAN ASSISTANT
Payer: COMMERCIAL

## 2023-09-08 VITALS
OXYGEN SATURATION: 98 % | TEMPERATURE: 96.6 F | SYSTOLIC BLOOD PRESSURE: 130 MMHG | BODY MASS INDEX: 24.07 KG/M2 | HEART RATE: 66 BPM | DIASTOLIC BLOOD PRESSURE: 76 MMHG | WEIGHT: 177.5 LBS

## 2023-09-08 DIAGNOSIS — D66 HEMOPHILIA A (H): Primary | ICD-10-CM

## 2023-09-08 DIAGNOSIS — D66 HEMOPHILIA A (H): ICD-10-CM

## 2023-09-08 DIAGNOSIS — R58 HEMORRHAGE: ICD-10-CM

## 2023-09-08 PROCEDURE — 99212 OFFICE O/P EST SF 10 MIN: CPT | Performed by: PHYSICIAN ASSISTANT

## 2023-09-08 PROCEDURE — G0463 HOSPITAL OUTPT CLINIC VISIT: HCPCS | Performed by: PHYSICIAN ASSISTANT

## 2023-09-08 RX ORDER — ANTIHEMOPHILIC FACTOR (RECOMBINANT) 3000 (+/-)
KIT INTRAVENOUS
Qty: 4220 EACH | Refills: 0 | Status: SHIPPED | OUTPATIENT
Start: 2023-09-08 | End: 2023-11-07

## 2023-09-08 NOTE — PROGRESS NOTES
This staff assisted Clyde with his infusion of Advate 4248 units. IV access was obtained in his left antecubital space and infusion completed without issue. Patient was monitored post infusion before presenting to pharmacy to  a new dose of Advate to keep at home.    Clyde plans to rest today and will use compression, ice and elevation for his left lower leg soft tissue bleed. Clyde was able to palpate the borders of his soft tissue bleed to monitor for changes over the weekend. In the unlikely instance the bleeding increases, he will seek evaluation and additional factor over the weekend. Clyde verbalized understanding of the plan. Staff will call him Monday to check in.    Kaya BAUTISTAN, RN, PHN   [facility] for Bleeding and Clotting Disorders   Office: [phone]  Fax: [phone]

## 2023-09-08 NOTE — PROGRESS NOTES
"  [facility] for Bleeding and Clotting [facility]  [address] [place] [address], [address], [place] [address]  Main: [phone], Fax: [phone]          SUBJECTIVE:  Clyde Becerra is a 23 year old male that is well known to the [facility] for his history of moderate hemophilia A (baseline factor VIII 1-2%) without inhibitor. He is treated on-demand with recombinant factor VIII (Advate). Please refer to his most recent comprehensive hemophilia clinic visit note dated 08/08/2023 for additional details regarding his history.     He presents this Friday morning due to acute injury. During his softball game last night, he was hit by a softball on the anterior left shin. He reports that a \"lump\" developed relatively quickly and it was quite painful. This morning, the pain has improved but he started to develop ecchymosis and he feels that the lump may be larger and firmer. No paresthesias. No weakness.    OBJECTIVE:        Limb is warm and well perfused. No evidence of compartment syndrome.  Left knee and left ankle show no signs of hemarthrosis.    ASSESSMENT:  #Moderate hemophilia A (baseline factor VIII 1-2%) without inhibitor.   #Acute left pretibial soft tissue hemorrhage.    PLAN:  1) Patient's home dose of Advate 4220 units was administered in clinic today. His home supply was replenished.  2) Advised ice, compression, elevation, and avoidance of strenuous activity.  3) Signs and symptoms of ongoing bleeding were reviewed.  4) Will plan to re-assess patient Monday via phone call.      Total time spent by me on this encounter, including patient facing time, documentation time, and care coordination: 10 minutes.      Latosha Calixto PA-C, [facility] for Bleeding and Clotting Disorders  [address] [place] [address], [address], [place] [address]  Main: [phone], Fax: [phone]    "

## 2023-09-11 ENCOUNTER — TELEPHONE (OUTPATIENT)
Dept: HEMATOLOGY | Facility: CLINIC | Age: 23
End: 2023-09-11
Payer: COMMERCIAL

## 2023-09-11 NOTE — TELEPHONE ENCOUNTER
[id number]  Clyde Becerra  23 year old male  CBCD Diagnosis: moderate hemophilia A  CBCD Provider: Dr. Osei & Latosha Calixto PA-C          Call placed to Clyde to check in on him after he was seen on 9/8 for a soft tissue bleed of his lower left leg.    Left message for Clyde to call back and update the team on how he's doing. Call back number provided.    Kaya BAUTISTAN, RN, PHN   [facility] for Bleeding and Clotting Disorders   Office: [phone]  Fax: [phone]

## 2023-11-07 ENCOUNTER — TELEPHONE (OUTPATIENT)
Dept: HEMATOLOGY | Facility: CLINIC | Age: 23
End: 2023-11-07
Payer: COMMERCIAL

## 2023-11-07 DIAGNOSIS — D66 HEMOPHILIA A (H): ICD-10-CM

## 2023-11-07 RX ORDER — ANTIHEMOPHILIC FACTOR (RECOMBINANT) 3000 (+/-)
KIT INTRAVENOUS
Qty: 4220 EACH | Refills: 0 | Status: SHIPPED | OUTPATIENT
Start: 2023-11-07 | End: 2023-12-05

## 2023-11-07 RX ORDER — AMINOCAPROIC ACID 0.25 G/ML
SYRUP ORAL
Qty: 236.5 ML | Refills: 0 | Status: SHIPPED | OUTPATIENT
Start: 2023-11-07 | End: 2023-11-07

## 2023-11-07 RX ORDER — AMINOCAPROIC ACID 0.25 G/ML
SYRUP ORAL
Qty: 473 ML | Refills: 0 | Status: SHIPPED | OUTPATIENT
Start: 2023-11-07 | End: 2023-12-05 | Stop reason: ALTCHOICE

## 2023-11-07 NOTE — PROGRESS NOTES
[facility] for Bleeding and Clotting [facility]  [address] [place] [address], [address] [address]  Main: [phone], Fax: [phone]          Clyde Becerra is a 23 year old male that is well known to the [facility] for his history of moderate hemophilia A (baseline factor VIII 1-2%) without inhibitor. He is treated on-demand with recombinant factor VIII (Advate). Please refer to his most recent comprehensive hemophilia clinic visit note dated 2023 for additional details regarding his history.     He is getting some dental fillings with injectable numbing medications on 2023.    Advised the followin) PRIOR to fillings: Advate 4220 units +/-10% IV once on the morning of dental fillings.  2) AFTER fillings: Amicar 3g PO swish and swallow q6-8h x5-7 days.        Latosha Calixto PA-C, [facility]  Center for Bleeding and Clotting Disorders  [address] [place] [address], [address] [place] [address]  Main: [phone], Fax: [phone]

## 2023-11-07 NOTE — TELEPHONE ENCOUNTER
[id number]  Clyde Becerra  23 year old male  CBCD Diagnosis: moderate hemophilia A  CBCD Provider: Dr. Osei & Latosha Calixto PA-C        Communication received from Clyde that he is scheduled for 2 cavities to be filled on Thursday 11/9/23 in [place] at 11am.    Plan made for Clyde to come to CBCD at 10am on 11/9/23 for an infusion and to  Amicar.    Clyde will also be scheduling his wisdom tooth extraction after he has his upcoming consult. Staff advised Clyde to schedule this for a Monday in the late morning or early afternoon. This will allow him to come to the CBCD prior to his extraction and again during the week for subsequent infusions. He will alert the CBCD team as to when this gets scheduled.    Kaya BAUTISTAN, RN, PHN   [facility] for Bleeding and Clotting Disorders   Office: [phone]  Fax: [phone]

## 2023-11-09 ENCOUNTER — ALLIED HEALTH/NURSE VISIT (OUTPATIENT)
Dept: HEMATOLOGY | Facility: CLINIC | Age: 23
End: 2023-11-09
Payer: COMMERCIAL

## 2023-11-09 VITALS
OXYGEN SATURATION: 96 % | TEMPERATURE: 97.7 F | HEART RATE: 70 BPM | DIASTOLIC BLOOD PRESSURE: 69 MMHG | SYSTOLIC BLOOD PRESSURE: 110 MMHG | HEIGHT: 72 IN | BODY MASS INDEX: 23.66 KG/M2 | WEIGHT: 174.7 LBS

## 2023-11-09 DIAGNOSIS — D66 HEMOPHILIA A (H): Primary | ICD-10-CM

## 2023-11-09 RX ORDER — TRANEXAMIC ACID 650 MG/1
1300 TABLET ORAL 2 TIMES DAILY
Qty: 28 TABLET | Refills: 0 | Status: SHIPPED | OUTPATIENT
Start: 2023-11-09 | End: 2023-11-16

## 2023-11-09 NOTE — PROGRESS NOTES
[id number]  Clyde Becerra  23 year old male  CBCD Diagnosis: moderate hemophilia A  CBCD Provider: Dr. Osei & Latosha Calixto PA-C              Plan:  1) PRIOR to fillings: Advate 4220 units +/-10% IV once on the morning of dental fillings.  2) AFTER fillings: Amicar 3g PO swish and swallow q6-8h x5-7 days.    Clyde presented to the CBCD for assistance in infusing Advate prior to his dental fillings scheduled for 11am today.     IV access obtained in Clyde's right arm. Staff assisted in infusing Advate 4181 units without issue. Clyde was monitored post infusion and left in good condition. Prior to discharge staff reviewed instructions for Amicar post fillings and everett Clyde to pharmacy to  his medications. Clyde will call the CBCD once his wisdom tooth extraction is scheduled.    Kaya VALLE, RN, PHN   [facility] for Bleeding and Clotting Disorders   Office: [phone]  Fax: [phone]     Addendum    Plan changed from Amicar to TXA. Pt and pt's mother informed of this change and reviewed new instructions.      Outpatient Medication Detail     Disp Refills Start End PATT   tranexamic acid (LYSTEDA) 650 MG tablet 28 tablet 0 11/9/2023 11/16/2023 No   Sig - Route: Take 2 tablets (1,300 mg) by mouth 2 times daily for 7 days - Oral   Sent to pharmacy as: Tranexamic Acid 650 MG Oral Tablet   Class: E-Prescribe     Kaya VALLE, RN, N   [facility] for Bleeding and Clotting Disorders   Office: [phone]  Fax: [phone]      Advancement Flap (Single) Text: The defect edges were debeveled with a #15 scalpel blade.  Given the location of the defect and the proximity to free margins a single advancement flap was deemed most appropriate.  Using a sterile surgical marker, an appropriate advancement flap was drawn incorporating the defect and placing the expected incisions within the relaxed skin tension lines where possible.    The area thus outlined was incised deep to adipose tissue with a #15 scalpel blade.  The skin margins were undermined to an appropriate distance in all directions utilizing iris scissors.

## 2023-11-13 ENCOUNTER — TELEPHONE (OUTPATIENT)
Dept: HEMATOLOGY | Facility: CLINIC | Age: 23
End: 2023-11-13
Payer: COMMERCIAL

## 2023-11-13 ENCOUNTER — DOCUMENTATION ONLY (OUTPATIENT)
Dept: HEMATOLOGY | Facility: CLINIC | Age: 23
End: 2023-11-13
Payer: COMMERCIAL

## 2023-11-13 NOTE — TELEPHONE ENCOUNTER
[id number]  Clyde Becerra  23 year old male  CBCD Diagnosis: moderate hemophilia A  CBCD Provider: Dr. Osei & Latosha Calixto PA-C              Communication received from Clyde that [facility] At that visit they will determine the date of wisdom tooth extraction. Per Clyde's message, the surgeon isn't there on Mondays but is on Tuesdays so that is when he'll aim to schedule for.    He will alert the CBCD to his surgery date when it is scheduled.    Orders can be faxed to [facility] at [phone]    Kaya BAUTISTAN, RN, PHN   [facility] for Bleeding and Clotting Disorders   Office: [phone]  Fax: [phone]

## 2023-11-13 NOTE — PROGRESS NOTES
Community Counts! - CDC Public Health Surveillance Results  Clyde Becerra, 2000, Labs from 8/8/2023 have returned from CDC Community Counts Registry. Results included:      Factor VIII Inhibitor: 0.1 [place] units   COVID-19 Ab Testing: Negative- No evidence for past or present SARS-CoV-2 infection.        Labs were drawn at the Center for [facility]ot[facility] and sent to the CDC Surveillance Laboratory to result. See media tab for scanned document. Original copy available through [facility] web site. Results were mailed to the patient.      Dori Evangelista, MPH    [facility] for Bleeding and Clotting  [phone] (p) [phone] (f)

## 2023-11-28 ENCOUNTER — TELEPHONE (OUTPATIENT)
Dept: HEMATOLOGY | Facility: CLINIC | Age: 23
End: 2023-11-28
Payer: COMMERCIAL

## 2023-11-28 NOTE — TELEPHONE ENCOUNTER
"[facility]ot[facility]    Received as text from Clyde today asking \"did you guys ever fax them. They said they never got one.  Now they can't schedule me sweeeet.\"  See SW note from 11/13 about text re: oral surgeon office.  See RN note re: TC with patient today.    Clyde went on to express frustration that treatment recommendations were not sent to the oral surgeon's office ahead of his consult. Both RN by phone and SW via text explained recommendations are made after the consultation where the procedure is determined.  Pt asked for information on the next closest treatment center; writer stated [place] or [place] and patient stated he does not plan to establish care elsewhere.  Patient sent writer a video of himself throwing away his factor.    Attempted to call patient to discuss and phone went right to voicemail which is not set up.  Will try again later.    Nellie Kramer, [facility]  Clinical   [facility]  Office: [phone]      Addendum:  Writer received call from pt's surgeon Dr Nuñez.   He stated that they will schedule Clyde after they receive recommendations.   Provided his information to Latosha Calixto PA-C to call in the morning.    Addendum 11/29:   PA spoke with Dr. Nuñez.  Attempted to call patient to discuss and phone went right to voicemail which is not set up.   Follow up text sent informing him that surgeon called writer, and PA spoke with surgeon to give recommendations.  Encouraged patient to retrieve his factor from the trash can.  Pt replied \"I'm scheduled next Wednesday but like I said I don't have hemophilia anymore.  Whatever happens happens.\"  Updated provider and asked pt if he would accept a call from Latosha to discuss.  Pt stated he doesn't like to talk on the phone when angry, but that he would see if a friend could infuse him before his procedure.  Writer updated staff and will check in with patient again early next week.  "

## 2023-11-28 NOTE — CONFIDENTIAL NOTE
[id number]  Clyde Becerra  23 year old male  CBCD Diagnosis: Moderate Hemophilia A  CBCD Provider: Marily/Farnaz    RN called [facility] in [place]. Clyde was seen for dental consultation this morning. Their plan is to remove 4 wisdom teeth under general anesthesia. They need medical clearance, plan and history prior to scheduling. These recommendations can be sent to Dr. Mike Nuñez at [phone].     Caitlyn Zhang  MSN, RN, PHN  [facility] for Bleeding and Clotting Disorders  Office: [phone]  Fax: [phone]

## 2023-11-28 NOTE — TELEPHONE ENCOUNTER
[id number]  Clyde Becerra  23 year old male  CBCD Diagnosis: moderate Hemophilia A  CBCD Provider: Latosha Calixto PA-C    Clyde Becerra is calling in having just come from  oral surgeon's office ([facility] in [place]).  He was to have a consultation for wisdom teeth extraction this morning..  He was not seen as the office had not received a treatment plan from our office.      Did review with Clyde that we were aware of this consultation visit but typically do not create a plan until after they have had a consultation visit so that we can develop a plan specific to the actual work being done.      He has been [facility]ing back and forth with [facility] , [facility], which is the communication plan worked out with Clyde.    Did offer to reach out to the surgeon's office to get more details and get them some documentation.  Clyde was very angry and ultimately hungup the phone.    This offer to advocate with the oral surgeon on his behalf is being pursued via text with social work and patient.    Kari BAUTISTAN, RN   Nurse Clinician  [facility] for [facility]ot[facility]  Office: [phone]  Main Office: [phone] (ask to speak with a nurse)  Fax: [phone]

## 2023-11-29 ENCOUNTER — DOCUMENTATION ONLY (OUTPATIENT)
Dept: HEMATOLOGY | Facility: CLINIC | Age: 23
End: 2023-11-29
Payer: COMMERCIAL

## 2023-11-29 NOTE — PROGRESS NOTES
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 He is treated on-demand with recombinant factor VIII (Advate). Please refer to his most recent comprehensive hemophilia clinic visit note dated 08/08/2023 for additional details regarding his history.      He is to undergo extraction of 4 erupted wisdom teeth and is in need of a periprocedural plan.    OBJECTIVE  Wt Readings from Last 3 Encounters:   11/09/23 79.2 kg (174 lb 11.2 oz)   09/08/23 80.5 kg (177 lb 8 oz)   08/11/23 79.9 kg (176 lb 3.2 oz)     Component      Latest Ref Rng 3/1/2022  3:33 PM   Factor 8 Assay      55 - 200 % 2 (LL)         ASSESSMENT  Moderate hemophilia A (baseline factor VIII 1-2%).  Needs extraction of 4 wisdom teeth (all erupted).    PLAN  On the morning of extractions: Advate ~50u/kg IV bolus.  Post-op: Lysteda 1300mg PO BID  x5-7 days.      Latosha Calixto PA-C, [facility]  [address] [place] [address], [address], [place] [address]  Main: [phone], Fax: [phone]

## 2023-12-04 ENCOUNTER — TELEPHONE (OUTPATIENT)
Dept: HEMATOLOGY | Facility: CLINIC | Age: 23
End: 2023-12-04
Payer: COMMERCIAL

## 2023-12-05 DIAGNOSIS — D66 HEMOPHILIA A (H): ICD-10-CM

## 2023-12-05 RX ORDER — TRANEXAMIC ACID 650 MG/1
1300 TABLET ORAL 2 TIMES DAILY
Qty: 28 TABLET | Refills: 0 | Status: SHIPPED | OUTPATIENT
Start: 2023-12-05 | End: 2023-12-12

## 2023-12-05 RX ORDER — ANTIHEMOPHILIC FACTOR (RECOMBINANT) 3000 (+/-)
KIT INTRAVENOUS
Qty: 4220 EACH | Refills: 0 | Status: SHIPPED | OUTPATIENT
Start: 2023-12-05 | End: 2024-06-18

## 2023-12-05 NOTE — TELEPHONE ENCOUNTER
"[place] [facility]ot[facility]  Social Work Note    Writer outreached to patient on Monday AM checking in on his plan for Wednesday for his infusion ahead of his wisdom tooth procedure, and he replied Monday PM \"nah.\"    Writer discussed with PA and RN.  RN got a call from pt's mother (consent on file to communicate).  Following pt's individual communication plan, RN and AYAZ texted patient, \"Good Morning! Want to be transparent: your mom called the clinic to confirm the plan surrounding your wisdom tooth procedure, and we told her you were planning to find someone to infuse you in the morning.  Throwing a hail torres here, but incase you need it, Kaya is available to infuse you at 845 AM tomorrow. You'd be in/out very quickly!  Your mom said she'd swing by after your appointment to get your antifibrinolytic (TXA, likely, not amicar but that's TBD) and your replacement factor dose.  If you come in and see Kaya, we can send that with you then.  I know it's easier to not have to deal with hemophilia.  But, we want this to go well now, so you don't have to deal way more intensely later - please think it over and let us know how else we can support you!\"    Await response from patient.    Nellie Kramer, [facility]  Clinical   [place] [facility]ot[facility]  Office: [phone]    Addendum:  Clyde replied stating he is still rolling with the plan of saying that he got medicine.  Writer updated care team.  Pt asked who we have consent to speak to, writer replied that we have signed consent to talk to his mother, father, sister, and for continuity of care.  MD communicated \"I know you are very frustrated about having to deal with your hemophilia, but it is not safe for you to have your wisdom teeth removed without having a dose of factor first.  Without factor on board, you could have serious bleeding complications.  If you don't want to do the infusion yourself, we are happy to have you come " "by our clinic in the morning to assist you.  Please let us know either way.\"  Appointment remains available for patient at 845am tomorrow for an infusion ahead of his procedure.  Nellie/DANNIELLE 4:43pm    Addendum 12/7:  Writer received angry messaging from patient around PHI.  Writer reiterated consents on file and outlined pt right to cancel in writing.  Pt stated he \"probably won't be around much longer.\"  Welfare check requested ([phone]) and spoke with officer Sathya ([phone]).  Received collateral from pt's mother Chad ([phone]).  Patient later texted he was going to come to the clinic to update his FELIX.   and security aware.    "

## 2023-12-06 ENCOUNTER — ALLIED HEALTH/NURSE VISIT (OUTPATIENT)
Dept: HEMATOLOGY | Facility: CLINIC | Age: 23
End: 2023-12-06
Payer: COMMERCIAL

## 2023-12-06 VITALS
WEIGHT: 172.1 LBS | HEART RATE: 63 BPM | TEMPERATURE: 96.6 F | OXYGEN SATURATION: 96 % | SYSTOLIC BLOOD PRESSURE: 149 MMHG | BODY MASS INDEX: 23.34 KG/M2 | DIASTOLIC BLOOD PRESSURE: 106 MMHG

## 2023-12-06 DIAGNOSIS — D66 HEMOPHILIA A (H): Primary | ICD-10-CM

## 2023-12-06 NOTE — PROGRESS NOTES
[id number]  Clyde Becerra  23 year old male  CBCD Diagnosis: moderate hemophilia A  CBCD Provider: Dr. Osei & Latosha Calixto PA-C      Clyde presented today for a staff assisted infusion of Advate prior to the extraction of his wisdom teeth.    Staff assisted in obtaining IV access in his right AC and infused Advate 4000 units. Infusion completed without issue, pt observed post infusion.    Clyde went to pharmacy after the infusion to  his replacement dose of Advate and his Lysteda. Clyde is aware that he is being instructed to take Lysteda 1300mg PO BID x5-7days. He will call the CBCD with any questions or concerns.     Pt left clinic in good condition.    Kaya BAUTISTAN, RN, PHN   [facility]ot[facility]   Office: [phone]  Fax: [phone]

## 2023-12-07 ENCOUNTER — TELEPHONE (OUTPATIENT)
Dept: HEMATOLOGY | Facility: CLINIC | Age: 23
End: 2023-12-07
Payer: COMMERCIAL

## 2023-12-07 NOTE — TELEPHONE ENCOUNTER
"[facility]  Brief Social Work Note    Writer received angry messaging from patient around PHI.  Writer reiterated consents on file and outlined pt right to cancel in writing.  Pt stated he \"probably won't be around much longer.\"  Welfare check requested ([phone]) and spoke with officer Sathya ([phone]).  Received collateral from pt's mother Chad ([phone]).  Patient later texted he was going to come to the clinic to update his FELIX.   and security aware.     Nellie Kramer, [facility]  Clinical   [place] for Bleeding [facility] Clotting Disorders  Office: [phone]      "

## 2023-12-08 ENCOUNTER — DOCUMENTATION ONLY (OUTPATIENT)
Dept: HEMATOLOGY | Facility: CLINIC | Age: 23
End: 2023-12-08
Payer: COMMERCIAL

## 2023-12-08 NOTE — PROGRESS NOTES
Medication/Dose: Advate Vial    Cover My Meds Key: [id number]     If notification received from pharmacy:   Pharmacy name: [facility]   Pharmacy Phone: [phone]   Insurance name: [employer]     PA approved.   Effective dates: 10/09/2023 to 11/07/2024   Case #: [id number]        Kellen Turner, Pharmacy Coordinator  [facility]  [phone]

## 2023-12-12 ENCOUNTER — TELEPHONE (OUTPATIENT)
Dept: HEMATOLOGY | Facility: CLINIC | Age: 23
End: 2023-12-12
Payer: COMMERCIAL

## 2023-12-12 NOTE — TELEPHONE ENCOUNTER
[facility]  Brief Social Work Note    Clyde Becerra is a 23 year old male with moderate hemophilia A who had his wisdom teeth extracted on 12/6/23.    Writer received a call from the pt's mother today and spoke with her at length, primarily to provide emotional support.    She reported that Clyde is recovering from his wisdom teeth extraction at his apartment in [place].  His girlfriend Lidia has been his primary support person.  She stated Clyde left his Lysteda at his parents's home from Thursday through Saturday.  After experiencing an increase in oral bleeding and pain on Saturday, his girlfriend Lidia picked-up his Lysteda from his parents's home and pt is reportedly using it as directed.    CBCD Clinic remains available to support patient.    Nellie Kramer, [facility]  Clinical   [facility]otting [facility]  Office: [phone]

## 2024-01-18 ENCOUNTER — TELEPHONE (OUTPATIENT)
Dept: HEMATOLOGY | Facility: CLINIC | Age: 24
End: 2024-01-18
Payer: COMMERCIAL

## 2024-01-18 NOTE — TELEPHONE ENCOUNTER
[facility]otting [facility]  Brief Social Work Note    Clyde Becerra is a 23 year old male with moderate hemophilia A.  Writer spoke with his mother, Chad, today (consent on file) to check in on pt status.    Chad reported that her contact with patient has been minimal, but pleasant. She continues to keep a boundary around their interactions and most are limited to family gatherings and occasional help with laundry.  His primary support person is his girlfriend, Lidia.  Chad continues to worry about his overall mental health status. Clyde's family bought Clyde a goldendoodle (Philip) when he got his port removed 11 years ago, and Philip is currently ill.  Discussed his previous reactions to death and illness and provided education on strategies for discussing this topic as a family.    Regarding his bleeding disorder, he has not had bleeding disorder issues/concerns that she is aware of.  She noted that Clyde does continue to wear his medical ID tag.  Clyde is currently on his mother's health insurance but will age out this summer as per the [employer]. Reviewed strategies to proactively discuss this with Clyde, and resources that are available for assistance.    CBCD staff remain available to support pt/family as indicated.    Nellie Kramer, [facility]  Clinical   Center for Ble[facility] Clotting Disorders  Office: [phone]

## 2024-02-09 NOTE — TELEPHONE ENCOUNTER
Ways to Help Prevent Falls at Home    Quick Tips   ? Ask for help if you need it. Most people want to help!   ? Get up slowly after sitting or laying down   ? Wear a medical alert device or keep cell phone in your pocket   ? Use night lights, especially areas near a bathroom   ? Keep the items you use often within reach on a small stool or end table   ? Use an assistive device such as walker or cane, as directed by provider/physical therapy   ? Use a non-slip mat and grab bars in your bathroom. Look for home health sections for best options     Other Areas to Focus On   ? Exercise and nutrition: Regular exercise or taking a falls prevention class are great ways improve strength and balance. Don’t forget to stay hydrated and bring a snack!   ? Medicine side effects: Some medicines can make you sleepy or dizzy, which could cause a fall. Ask your healthcare provider about the side effects your medicines could cause. Be sure to let them know if you take any vitamins or supplements as well.   ? Tripping hazards: Remove items you could trip on, such as loose mats, rugs, cords, and clutter. Wear closed toe shoes with rubber soles.   ? Health and wellness: Get regular checkups with your healthcare provider, plus routine vision and hearing screenings. Talk with your healthcare provider about:   o Your medicines and the possible side effects - bring them in a bag if that is easier!   o Problems with balance or feeling dizzy   o Ways to promote bone health, such as Vitamin D and calcium supplements   o Questions or concerns about falling     *Ask your healthcare team if you have questions     ©[employer], 2022    "[facility]    Received the following text from patient, which was shared with his primary RN, Kaya Maurer.  Acknowledged receipt of text to patient.    \"[facility] scheduled my consultation for the 28th.    They gave me the # below for a fax # so they can know the game plan when I do get my wisdom teeth out!    [facility] FAX: [phone]    They said there surgeon normally isn't there Monday's but always Tuesdays so we might have to gameplan around a Tuesday, but the consultation isn't still for a couple more weeks.\"    Nellie Kramer, [facility]  Clinical   [place] for Bleeding and Clotting Disorders  Office: [phone]      "

## 2024-05-17 ENCOUNTER — TELEPHONE (OUTPATIENT)
Dept: HEMATOLOGY | Facility: CLINIC | Age: 24
End: 2024-05-17
Payer: COMMERCIAL

## 2024-05-17 NOTE — TELEPHONE ENCOUNTER
Pt said he is not coming to our clinic anymore/did not schedule him/ reached out to provider and team for clarification

## 2024-05-23 ENCOUNTER — TELEPHONE (OUTPATIENT)
Dept: HEMATOLOGY | Facility: CLINIC | Age: 24
End: 2024-05-23
Payer: COMMERCIAL

## 2024-05-23 NOTE — TELEPHONE ENCOUNTER
"[id number]  Clyde Becerra  23 year old male  CBCD Diagnosis: moderate hemophilia A  CBCD Provider: Dr. Osei & Latosha Calixto PA-C    Call placed to Clyde to follow up on his statement to the  staff about not planning to receive care at the [facility] moving forward.    Clyde told this staff that he does not plan to come back to the [facility]. Clyde stated, \" I don't have hemophilia anymore and I don't care about it\". He denies plans to establish care elsewhere. Staff offered support in getting on his own insurance as he is turning 24 this summer. Clyde states \" I don't care about insurance\".     Clyde went on to talk about how upset he was that the [employer] were called to do a welfare check on him. Because of this[facility]. He notes that even talking about it makes him upset.    Staff verbalized understanding of his feelings and stated that they are sorry to hear they don't want to return for care. Staff assured Clyde that if he is to need us in the future or need assistance getting established elsewhere, we are here for him and want the best for him.    Kaya BAUTISTAN, RN, PHN   [facility] Clotting [facility]   Office: [phone]  Fax: [phone]       "

## 2024-06-03 ENCOUNTER — TELEPHONE (OUTPATIENT)
Dept: HEMATOLOGY | Facility: CLINIC | Age: 24
End: 2024-06-03
Payer: COMMERCIAL

## 2024-06-03 NOTE — TELEPHONE ENCOUNTER
[facility] for [facility]otting [facility]  Social Work Note    Received call from the pt's mother (consent on file) seeking to provide an update on Clyde and to request insurance resources, as Clyde turns 24 and will lose eligibility under his parent's health plan.    She stated Clyde sees a psychiatrist at [facility] and has been trying various mental health medications for his mood and sleep, most recently lamotrigine and quetiapine.  He recently stopped seeing a therapist due to mandated reporting done by that therapist.    He remains physically active, marathon training and softball.  His sister is getting  in August.  He is dating the same woman.    Informed her of staff's recent outreach attempts and that we remain available for support should he request it.  She stated he continues to think about his bleeding disorder.  For example, he recently brought up that he has a new friend who is a RN that could infuse him if needed.  She also stated he has two doses of factor at home; one   and the other expires in September.    Discussed community based resources for insurance options she can offer to Clyde and provided general education on insurance networks and access to medication.    Nellie Kramer, [facility]  Clinical   [facility]otting [facility]  Office: [phone]

## 2024-06-17 ENCOUNTER — TELEPHONE (OUTPATIENT)
Dept: HEMATOLOGY | Facility: CLINIC | Age: 24
End: 2024-06-17
Payer: COMMERCIAL

## 2024-06-17 ENCOUNTER — DOCUMENTATION ONLY (OUTPATIENT)
Dept: PHYSICAL THERAPY | Facility: CLINIC | Age: 24
End: 2024-06-17
Payer: COMMERCIAL

## 2024-06-17 DIAGNOSIS — D66 HEMOPHILIA A (H): ICD-10-CM

## 2024-06-17 DIAGNOSIS — M25.561 ACUTE PAIN OF RIGHT KNEE: Primary | ICD-10-CM

## 2024-06-17 DIAGNOSIS — D66 HEMOPHILIA A (H): Primary | ICD-10-CM

## 2024-06-17 NOTE — PROGRESS NOTES
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 He is treated on-demand with recombinant factor VIII (Advate). Please refer to his most recent comprehensive hemophilia clinic visit note dated 2023 for additional details regarding his history.       He presents today for assessment of lateral right knee pain. He has been training for a half marathon. The race is this upcoming weekend.    The pain first occurred only while running and not at rest. At first, it would start around mile 6, but more recently, it was starting during miles 1 and 2. At present, he experiences the pain even while at rest. He reports that it is difficult to walk or bend the knee without pain along the lateral aspect of the knee. The pain also radiates in superiorly and inferiorly to the level of ankle. Activities like walking up and down stairs or stepping to the bathtub are painful. There is no swelling but he can feel it throbbing. He has been using Tylenol but does not find relief from that. He last ran yesterday but had to stop before reaching 2 miles due to significant pain.     OBJECTIVE:  Constitutional: Appears well. Not in distress.   Respiratory: Breathing comfortably on room air.  Neuro: Aox3.  Right lower extremity: No swelling, warmth, or erythema of the knee joint itself. Patient is able to localize his pain over the lateral aspect of the knee with radiation superiorly and inferiorly to the level of the ankle. His flexion/extension is limited due to a sensation of \"tightness\". He " has a significant degree of ecchymosis along the lateral aspect of the thigh, and along the inferior aspect of the right knee. There is also dependent bruising down the shin and to the level of the ankle.    Wt Readings from Last 3 Encounters:   12/06/23 78.1 kg (172 lb 1.6 oz)   11/09/23 79.2 kg (174 lb 11.2 oz)   09/08/23 80.5 kg (177 lb 8 oz)     ASSESSMENT/PLAN:  1) Moderate hemophilia A (baseline factor VIII 1-2%).  2) Right knee pain, likely 2/2 IT band syndrome. He has quite a bit of ecchymosis, at various stages, despite no deep massage, foam rolling, etc. With him having run through injury as recent as yesterday, there could be some soft tissue bleeding at present, so will infuse him with a PRN dose of Advate. I also advised Celebrex 200mg twice daily for the next 1-2 weeks. I also advised rest, ice, light compression, and elevation. Advised against running the half marathon this weekend, which is accepting of. Please refer to Carol Flannery DPT's note for additional details.    Total of 20 minutes spent on encounter.       Latosha Calixto PA-C, [facility]  [facility]  Center for Bleeding and Clotting Disorders  [facility] [address], [address], [place] [address]  Main: [phone], Fax: [phone]

## 2024-06-17 NOTE — TELEPHONE ENCOUNTER
[id number]  Clyde Becerra  23 year old male  CBCD Diagnosis: moderate hemophilia A  CBCD Provider: Dr. Osei & Latosha Calixto PA-C        Call received from Clyde looking for guidance regarding his right knee.    Clyde states that he started training for a half marathon and has been having intermittent right knee pain. Recently this pain has increased and rather than being able to run 6 miles before the pain starts, he's now noticing the pain after mile 1-2. Now he is reporting that it is hard to walk without pain and difficult to bend his knee. He cannot appreciate any swelling but endorses warmth and a throbbing sensation. He has been using Tylenol but does not find relief from that.    Reviewed with Latosha Calixto PA-C, Clyde should present to the CBCD for eval and possible infusion and a visit with PT tomorrow.    Call back to Clyde, he accepts 930am visit with Latosha. Staff advised ice, elevation and rest today. He is agreeable to this plan and will call back if his symptoms change or worsen.    Kaya BAUTISTAN, RN, PHN   [facility] Ble[facility] Clotting Disorders   Office: [phone]  Fax: [phone]

## 2024-06-18 ENCOUNTER — OFFICE VISIT (OUTPATIENT)
Dept: HEMATOLOGY | Facility: CLINIC | Age: 24
End: 2024-06-18
Attending: PHYSICIAN ASSISTANT
Payer: COMMERCIAL

## 2024-06-18 ENCOUNTER — THERAPY VISIT (OUTPATIENT)
Dept: PHYSICAL THERAPY | Facility: CLINIC | Age: 24
End: 2024-06-18
Attending: PHYSICIAN ASSISTANT
Payer: COMMERCIAL

## 2024-06-18 VITALS
HEART RATE: 68 BPM | BODY MASS INDEX: 24.14 KG/M2 | DIASTOLIC BLOOD PRESSURE: 87 MMHG | OXYGEN SATURATION: 98 % | SYSTOLIC BLOOD PRESSURE: 131 MMHG | HEIGHT: 72 IN | TEMPERATURE: 97.9 F | WEIGHT: 178.2 LBS

## 2024-06-18 DIAGNOSIS — D66 HEMOPHILIA A (H): ICD-10-CM

## 2024-06-18 DIAGNOSIS — M76.31 ILIOTIBIAL BAND SYNDROME, RIGHT: ICD-10-CM

## 2024-06-18 DIAGNOSIS — D66 HEMOPHILIA A (H): Primary | ICD-10-CM

## 2024-06-18 DIAGNOSIS — M25.561 ACUTE PAIN OF RIGHT KNEE: Primary | ICD-10-CM

## 2024-06-18 PROCEDURE — 99213 OFFICE O/P EST LOW 20 MIN: CPT | Performed by: PHYSICIAN ASSISTANT

## 2024-06-18 PROCEDURE — 999N000104 HC STATISTIC NO CHARGE: Performed by: REHABILITATION PRACTITIONER

## 2024-06-18 RX ORDER — QUETIAPINE FUMARATE 50 MG/1
50 TABLET, EXTENDED RELEASE ORAL DAILY PRN
COMMUNITY
Start: 2024-05-21

## 2024-06-18 RX ORDER — LAMOTRIGINE 200 MG/1
1 TABLET ORAL
COMMUNITY
Start: 2024-05-20

## 2024-06-18 RX ORDER — QUETIAPINE FUMARATE 50 MG/1
50 TABLET, FILM COATED ORAL
COMMUNITY
Start: 2024-05-20

## 2024-06-18 RX ORDER — ANTIHEMOPHILIC FACTOR (RECOMBINANT) 3000 (+/-)
KIT INTRAVENOUS
Qty: 4220 EACH | Refills: 0 | Status: SHIPPED | OUTPATIENT
Start: 2024-06-18 | End: 2024-07-19

## 2024-06-18 NOTE — CONFIDENTIAL NOTE
"Physical therapy screen (no associated billing)    Clyde was seen by Latosha Calixto PA-C for right knee pain in the setting of training for a Grandma's half marathon in [place] this Saturday, June 22nd.  Pain onset was only while running and no pain at rest. Per notes, \"At first, it would start around mile 6, but more recently, it was starting during miles 1 and 2. At present, he experiences the pain even while at rest. He reports that it is difficult to walk or bend the knee without pain. No swelling but does endorse warmth and throbbing.\"    Clyde's pain isolates to the lateral right knee along the joint line and extending a few inches proximal and distal from the joint line. No pain with palpation. Minimal to no edema. There are circular areas of bruising (purplish and greenish/yellow) along lateral thigh and some bruising below the knee as well.  There is palpable increased tightness along the lateral and posterior knee in accordance with the illiotibial band & lateral hamstring despite relaxation of the knee.  He describes altering his gait during walking and running due to this ongoing issue and his tendency to avoid right knee flexion.  He also describes increased pain and difficulty with knee flexion with stair negotiation.    Reviewed step to pattern during stair negotiation, RICE with the importance of being very vigilant if using compression, with Clyde indicating he is aware of these and willing to continue implementing them.  Suggested OP PT running program after symptoms have improved and he is interested in this and Latosha Calixto PA-C is entering the orders    VAISHALI ElderT  Physical Therapist  [facility]  Office: [phone] Clinic: [phone]  Fax: [phone]     " Show Applicator Variable?: Yes Duration Of Freeze Thaw-Cycle (Seconds): 0 Render Note In Bullet Format When Appropriate: No Detail Level: Detailed Consent: The patient's consent was obtained including but not limited to risks of crusting, scabbing, blistering, scarring, darker or lighter pigmentary change, recurrence, incomplete removal and infection. Post-Care Instructions: I reviewed with the patient in detail post-care instructions. Patient is to wear sunprotection, and avoid picking at any of the treated lesions. Pt may apply Vaseline to crusted or scabbing areas.

## 2024-06-18 NOTE — PATIENT INSTRUCTIONS
[facility]  Center for Bleeding and Clotting Disorders  [facility] [address], [address], [place] [address]  Main: [phone], Fax: [phone]        PLAN  Factor today.  Celebrex 200mg twice daily for the time being.

## 2024-06-18 NOTE — PROGRESS NOTES
This staff assisted with venipuncture for infusion of self supplied Advate 3868 units. Factor was infused into LAC without issue. Patient was observed post infusion and brought to pharmacy to  his replacement dose.    During our visit staff and Clyde discussed his summer thus far and upcoming summer plans. Clyde has been staying busy with friends, travel and going to the cabin. Clyde was pleasant and happy to connect with the team at the [facility] to address his current issue. Staff provided Clyde with [place] information as he will age out of his parents insurance after his birthday at the end of July. Staff encouraged Clyde to reach out to speak to our insurance expert if he runs into any issues.    Clyde left clinic in good condition with all questions addressed.    Kaya BAUTISTAN, RN, PHN   [facility] Bleeding and Clotting Disorders   Office: [phone]  Fax: [phone]

## 2024-06-24 ENCOUNTER — TELEPHONE (OUTPATIENT)
Dept: HEMATOLOGY | Facility: CLINIC | Age: 24
End: 2024-06-24
Payer: COMMERCIAL

## 2024-06-24 NOTE — TELEPHONE ENCOUNTER
[id number]  Clyde Becerra  23 year old male  CBCD Diagnosis: moderate hemophilia A  CBCD Provider: Dr. Osei & Latosha Calixto PA-C    Call placed to Clyde to check in on how his knee is doing.    Call went to voicemail, voicemail full. Pt does not do mychart msg.    Kaya BAUTISTAN, RN, PHN   [facility]   Office: [phone]  Fax: [phone]

## 2024-07-09 ENCOUNTER — TELEPHONE (OUTPATIENT)
Dept: HEMATOLOGY | Facility: CLINIC | Age: 24
End: 2024-07-09
Payer: COMMERCIAL

## 2024-07-09 NOTE — TELEPHONE ENCOUNTER
[facility]    Received a voicemail from Clyde's mother, Chad.  In the voicemail she stated that Clyde applied for health insurance, as he will be aging out of his parents's plan this month. She reported he was given 4 choices as an [facility] resident and she was looking for guidance.    Writer returned her call and a voicemail was left.    Nellie Kramer, [facility]  Clinical   [facility]  Office: [phone]    Addendum:  Spoke with carrington's mother on 7/10 and reviewed options with her.  She plans to choose [employer] and will submit the paperwork back to [facility] today.

## 2024-07-19 ENCOUNTER — OFFICE VISIT (OUTPATIENT)
Dept: HEMATOLOGY | Facility: CLINIC | Age: 24
End: 2024-07-19
Attending: PHYSICIAN ASSISTANT
Payer: COMMERCIAL

## 2024-07-19 ENCOUNTER — TELEPHONE (OUTPATIENT)
Dept: HEMATOLOGY | Facility: CLINIC | Age: 24
End: 2024-07-19
Payer: COMMERCIAL

## 2024-07-19 VITALS
TEMPERATURE: 98.1 F | SYSTOLIC BLOOD PRESSURE: 121 MMHG | BODY MASS INDEX: 24.68 KG/M2 | DIASTOLIC BLOOD PRESSURE: 82 MMHG | HEART RATE: 67 BPM | OXYGEN SATURATION: 98 % | HEIGHT: 72 IN | WEIGHT: 182.2 LBS

## 2024-07-19 DIAGNOSIS — D66 HEMOPHILIA A (H): ICD-10-CM

## 2024-07-19 PROCEDURE — 99213 OFFICE O/P EST LOW 20 MIN: CPT | Performed by: PHYSICIAN ASSISTANT

## 2024-07-19 PROCEDURE — G0463 HOSPITAL OUTPT CLINIC VISIT: HCPCS | Mod: 25

## 2024-07-19 PROCEDURE — 99214 OFFICE O/P EST MOD 30 MIN: CPT | Performed by: PHYSICIAN ASSISTANT

## 2024-07-19 PROCEDURE — 96374 THER/PROPH/DIAG INJ IV PUSH: CPT

## 2024-07-19 RX ORDER — ANTIHEMOPHILIC FACTOR (RECOMBINANT) 3000 (+/-)
KIT INTRAVENOUS
Qty: 4220 EACH | Refills: 0 | Status: SHIPPED | OUTPATIENT
Start: 2024-07-19

## 2024-07-19 NOTE — PROGRESS NOTES
Following visit with provider, patient was brought to the lab for factor infusion. 4000 units of patient supplied Advate was infused into the L AC without complication. Good blood return noted, no sign of infiltration. Patient stopped at [facility] pharmacy on the way out of clinic to  replacement dose of Advate. He will also get scheduled for a comp visit.    Denis PERSAUD RN  [facility] for Ble[facility]ot[facility]  Office: [phone]  Clinic: [phone]  Fax: [phone]

## 2024-07-19 NOTE — PROGRESS NOTES
[facility]  [address] [place] [id number]  Main: [phone], Fax: [phone]    Patient seen at: [facility] at [address] [place] [address]    Outpatient Visit Note:    Patient: Clyde Becerra  MRN: [id number]  : 2000  SLIM: 2024  Location of this writer at the time of this clinic visit was conducted: [facility].  Location of the patient at the time of this clinic visit was conducted: [facility].     Reason for visit:  Moderate hemophilia A. Evaluation after he sustained a softball hitting his left knee last night.     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 His last comprehensive hemophilia clinic visit was dated back to 2023. He is currently on on-demand, episodic factor VIII replacement therapy with Advate at 4220 Units +/- 10% per dose as needed for acute bleeding episodes. This dose is about 52 Units/kg. He was last seen by Latosha Calixto PA-C of this clinic back on 2024 for right knee pain that was thought to be related to IT band syndrome.     Interim History:  Clyde reports that he was playing softball last night at around 9:30pm when a softball hit him on the medial side of his left knee. He felt pain immediately but was able to walk. This morning when he awakes, he noticed an area of ecchymosis along the medial side of his left knee that has the imprint of the softball with more swelling as compared to last night. Thus he called our clinic to inquire about possibly getting a dose of factor VIII replacement therapy  "before he leaves for the weekend to the cabin in [place].     He denies any issues with ambulating. He also denies any limitation of his left knee joint. He has some other unexplained bruising on his right upper arm, left pre-tibial area and right lateral thigh.     ROS:  Denies any bleeding complications. Specifically, no frequent epistaxis. No issues with oral mucosal bleeding. Denies any hematuria or blood in stools. Denies any shortness of breath. No chest pain. No cough. No fever.    Medications, Allergies and PmHx:  All are reviewed by this writer today via electronic medical records    Social History:   Deferred.    Family History:  Deferred.    Objective:  Vitals: /82 (BP Location: Right arm, Patient Position: Sitting, Cuff Size: Adult Large)   Pulse 67   Temp 98.1  F (36.7  C) (Oral)   Ht 1.829 m (6' 0.01\")   Wt 82.6 kg (182 lb 3.2 oz)   SpO2 98%   BMI 24.71 kg/m    Exam:   There is a faint ecchymotic area over his right upper arm that seems to be older in evolution of the hematoma. He is unclear how he gets this hematoma.   There is also a older evolution of a subcutaneous hematoma over his lateral right thigh. He is unclear how he gets this hematoma.   There is a faint ecchymotic area over his left pre-tibial area. He is unclear how he gets this hematoma.   There is a large ecchymotic area that is more acute in appearance in the size of a softball over the medial side of his left knee with underlying swelling.   I examine his left knee today for which he has full active range of motion with no obvious swelling or effusion at the knee joint.     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     His clinical history and " examination today is consistent with a subcutaneous / soft tissue hematoma to the left medial knee area. There is no signs of acute hemarthrosis to the left knee.     Diagnosis:  Moderate hemophilia A.   Acute left knee area soft tissue / subcutaneous hematoma after he was hit by a softball about 14-15 hours ago.   Multiple other evolving subcutaneous hematomas on right arm, right thigh and left pre-tibial areas for which the patient does not recall how these occurred. No other significant bleeding.     Plan:  I am not concern about a development of a factor VIII inhibitor at this time.     For his acute soft tissue hematoma of the left knee, considering that there is a rather large area of ecchymosis with underlying swelling, I will have him get a dose of Advate at his home product available dose of around 4000 Units that he brought with him today. Will have one of our nursing staffs assist in infusion. I will sent a refill prescription to our pharmacy to have him get a replacement dose today. Clyde reports that he also has another dose of Advate at home that was  as of early 2024.     I also instruct him to do conservative management of rest, ice, compress and elevate. I asked him to avoid aggressive activities at the cabin this weekend.     I note that he is due for his hemophilia comprehensive clinic visit as of 2024. I will have him schedule for this at next available clinic appointment today before he leaves. He is interested in discussing about learning how to self infuse factor VIII products in the future to be more self sufficient in treating with acute injuries or trauma.       Wing Pitt PA-C, MPAS  Physician Assistant  [facility] for [facility].     30 minutes spent by me on the date of the encounter doing chart review, history and exam, documentation and further activities per the note.    Time IN: 11:15  Time OUT: 11:30

## 2024-07-19 NOTE — TELEPHONE ENCOUNTER
Incoming call from Clyde requesting to come in for a factor infusion for an acute bleed. Clyde was playing softball last night and got hit in his L knee by the ball. Swelling came on pretty quick and has worsened since last night. Clyde endorses minor bruising. His main complaint is that the joint is hot and heavy. Pain is about 4 out of 10 with ambulation. No numbness or tingling. Clyde would like to come to clinic today to be infused with Factor. He is leaving for [facility] later today. Clyde will bring in his dose of Advate from home. This is his last dose of factor and would like a replacement dose to take with him when he leaves clinic today.    LEONEL Pitt has agreed to see Clyde today. If felt to be having an active bleed [facility] RN will treat patient with factor infusion.    Denis PERSAUD RN  [facility]  Office: [phone]  Clinic: [phone]  Fax: [phone]

## 2024-07-22 ENCOUNTER — TELEPHONE (OUTPATIENT)
Dept: HEMATOLOGY | Facility: CLINIC | Age: 24
End: 2024-07-22
Payer: COMMERCIAL

## 2024-07-22 NOTE — TELEPHONE ENCOUNTER
[id number]  Clyde Becerra  23 year old male  [facility] Diagnosis: moderate hemophilia A  [facility] Provider: Dr. Osei & Latosha Calixto PA-C     Call placed to Clyde to check in on how he's doing after his visit to the [facility] and factor infusion last Friday.     Call went to voicemail, voicemail full. Pt does not do mychart msg.     Kaya BAUTISTAN, RN, PHN   [facility]   Office: [phone]  Fax: [phone]

## 2024-10-21 ENCOUNTER — CARE COORDINATION (OUTPATIENT)
Dept: HEMATOLOGY | Facility: CLINIC | Age: 24
End: 2024-10-21
Payer: COMMERCIAL

## 2024-10-21 DIAGNOSIS — D66 HEMOPHILIA A (H): Primary | ICD-10-CM

## 2024-10-21 NOTE — PROGRESS NOTES
[facility]  COMPREHENSIVE CLINIC PRE-VISIT CARE COORDINATION NOTE     NAME: Clyde Becerra  :  2000   AGE:  24 year old    Appointment date/time:  2024 at 830am    Assigned RN: Kaya Maurer RN   Diagnosis: Moderate Hem A   Factor level: 1-2%   Inhibitor Hx: No history of inhibitor      Viral issues:   HIV and HCV negative, immunized against/immune to HAV and HBV     Other health issues:  Benign familial macrocephaly, ADHD, hx of peritonsillar abscess in   Planned procedures or surgeries: none      Hemophilia treatment plan:  Advate 4220 units as needed for acute bleeding episodes    Method of logging infusions: n/a       Psychosocial:     Clyde has self reported a long history of anger outbursts, and that, at times, he finds it easier to deny that he has hemophilia.  Clyde's hx has been complicated by MH symptoms and periods of homelessness.  Writer spoke with pt's mom a few months ago (consent on file) to answer her questions re: insurance and provide brief counseling; at the time Clyde was living in an apartment and mom reported he was still dating his girlfriend, Lidia.  Mom stated he was being seen at [facility] for Bipolar disorder, and that he fired his therapist as they requested a welfare check.  Writer requested a welfare check on patient last December.  Will not plan to meet with patient unless pt requests to meet with SW.  Last CC or Office Visit:   Last clinic visit was 24 after being hit by a softball in his left knee the night prior.  Confirmation of appointment:     to do outreach.  Goals for Visit:           Medical Insurance:   Payer Type: State Programs  Payer Name: Other  HTC Specified Payer Name: [employer] [facility]  Insurance Type: Primary  Coverage Type: All (Medical and Drug)        Pharmacy Insurance: HE[employer] [facility]       Specialty Pharmacy: [facility]       Copay Assistance: Has Advate, but no  longer eligible while on a state funded insurance plan       HTC Assistance Fund: No       PDC: N/A     Pharmacokinetics: N/A, on demand     Pharmacy Notes:         Pharmacy to see (Y/N): Y         Joint issues:    R ankle: bleed 9/8/22 when tweaked on a base during softball, and again 11/18/22.  H/o recurrent L elbow pain attributed to lateral epicondylitis  Recurrent Right wrist stiffness, pain with palpation of abductor pollicis tendon, mild hypermobility in CMC and MCP joints. Wrist improvement with Hand Therapy with Nellie Torres OT 5/3-5/31/22.  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   H/o generalized back pain he attributed to sleeping in his car.  Orthopedic past medical history: None   Imaging:  right wrist radiographs 2/18/2022 No acute osseous abnormality.     PT orders:   Ordered LBB       Eligible studies: Eligible for subsequent registry      Transition Tracker Eligible: ?      Genetics:        Available if interested  genetic testing: [facility] 2009. F8 c. 1214T>C (p.[id number])   no family hx of hemophilia, though mother is a carrier based on low factor levels (45%)

## 2025-05-19 ENCOUNTER — TELEPHONE (OUTPATIENT)
Dept: HEMATOLOGY | Facility: CLINIC | Age: 25
End: 2025-05-19
Payer: COMMERCIAL

## 2025-05-19 NOTE — TELEPHONE ENCOUNTER
Call placed to Clyde to check in and offer to schedule annual comp visit.    Left message on Clyde's voicemail, callback number provided.    Kaya BAUTISTAN, RN, PHN   [facility] for Bleeding and Clotting Disorders   Office: [phone]  Fax: [phone]

## 2025-06-30 ENCOUNTER — TELEPHONE (OUTPATIENT)
Dept: HEMATOLOGY | Facility: CLINIC | Age: 25
End: 2025-06-30
Payer: COMMERCIAL

## 2025-06-30 ENCOUNTER — OFFICE VISIT (OUTPATIENT)
Dept: HEMATOLOGY | Facility: CLINIC | Age: 25
End: 2025-06-30
Attending: PHYSICIAN ASSISTANT
Payer: COMMERCIAL

## 2025-06-30 ENCOUNTER — DOCUMENTATION ONLY (OUTPATIENT)
Dept: HEMATOLOGY | Facility: CLINIC | Age: 25
End: 2025-06-30
Payer: COMMERCIAL

## 2025-06-30 VITALS
WEIGHT: 180.6 LBS | BODY MASS INDEX: 24.49 KG/M2 | OXYGEN SATURATION: 98 % | DIASTOLIC BLOOD PRESSURE: 77 MMHG | TEMPERATURE: 97 F | SYSTOLIC BLOOD PRESSURE: 123 MMHG | HEART RATE: 70 BPM

## 2025-06-30 DIAGNOSIS — D66 HEMOPHILIA A (H): Primary | ICD-10-CM

## 2025-06-30 DIAGNOSIS — D66 HEMOPHILIA A (H): ICD-10-CM

## 2025-06-30 DIAGNOSIS — M25.071 HEMARTHROSIS OF RIGHT ANKLE: ICD-10-CM

## 2025-06-30 PROCEDURE — 3074F SYST BP LT 130 MM HG: CPT | Performed by: PHYSICIAN ASSISTANT

## 2025-06-30 PROCEDURE — 3078F DIAST BP <80 MM HG: CPT | Performed by: PHYSICIAN ASSISTANT

## 2025-06-30 PROCEDURE — 99213 OFFICE O/P EST LOW 20 MIN: CPT | Performed by: PHYSICIAN ASSISTANT

## 2025-06-30 PROCEDURE — G0463 HOSPITAL OUTPT CLINIC VISIT: HCPCS | Performed by: PHYSICIAN ASSISTANT

## 2025-06-30 RX ORDER — ANTIHEMOPHILIC FACTOR (RECOMBINANT) 4000 (+/-)
4000 KIT INTRAVENOUS DAILY PRN
Qty: 8000 EACH | Refills: 0 | Status: SHIPPED | OUTPATIENT
Start: 2025-06-30

## 2025-06-30 RX ORDER — ANTIHEMOPHILIC FACTOR (RECOMBINANT) 3000 (+/-)
KIT INTRAVENOUS
Qty: 4220 EACH | Refills: 0 | Status: SHIPPED | OUTPATIENT
Start: 2025-06-30 | End: 2025-06-30 | Stop reason: DRUGHIGH

## 2025-06-30 NOTE — PATIENT INSTRUCTIONS
[facility]  Center [facility]  [facility] [address], [address], [place] [address]  Main: [phone], Fax: [phone]      We will you give you a dose of factor today.

## 2025-06-30 NOTE — PROGRESS NOTES
Medication/Dose: Advate 4000 units (+/-10%) iv every 24 hours as needed for bleeding   Cover My Meds Key: [id number]     If notification received from pharmacy:   Pharmacy name: [facility]   Pharmacy Phone: [phone]   Insurance name: [employer] [facility]     PA approved.   Effective dates: 5/31/2025 to 6/30/2026   Case #: [id number]        KOBY Leonard, JULIANN  Lead Pharmacy Coordinator  [facility]  [phone]

## 2025-06-30 NOTE — TELEPHONE ENCOUNTER
Medication and administration questions    The patient has Moderate Hemophilia A    Medication: Advate    Treatment regimen SHL    Treatment type On-demand    Does the patient self-infuse? No    Does the patient track infusions and bleeds?  No, but calls the clinic each time they have a bleed    Did you review the following patient-education topics, if applicable, with the patient? Completed    Allergies (enter in ERx)  Current medication list including prescriptions, OTC, herbals, and supplements (enter in ERx)  The following medications were added or changed: N/A  Vaccinations  Up to date  Weight (enter in ERx)  Diagnosis codes (enter in ERx)  Added the following: N/A    Instructions  Dose appropriate (High Dose Eval if needed)  Administration route or device preference  Missed doses  Food and/or drug interactions  Side effects, warnings, side effect management strategies  The patient reports the following adverse events/side effects:No side effects reported  What to expect & compliance  Pregnancy precautions  Storage, safe handling & disposal  Monitoring    Did you review the goals of therapy and management strategies with the patient? Patient educated & counseled     1. Prevention of bleeding episodes  2. Prevention of painful and disabling joint and muscle damage, including joint arthropathy  3. Minimize and manage side effects             What are the patient's goals for this therapy?  Prevention of bleeding episodes and Patient reports no goals at this time    Is patient meeting treatment goals? N/A    Quality of Life assessment    Has the patient experienced an ER admission, hospitalization or unplanned doctor visit related to their disease in the 12 months? Yes    If so was this visit due to lack of factor or supply? No: Patient comes in for on-demand therapy and gets Advate given in clinic. Presented today for acute ankle pain.     Adherence assessment    What is the patient's adherence as measured by (PDC  >0.8)? N/A patient fills on demand only    If patient has a low PDC <0.8 what is the reason and/or intervention? NA    Closing call questions    I confirm I addressed the following concerns during this assessment:     Adherence  Efficacy of therapy  Adverse drug events  Changes to medication, allergy and health profile  Check for drug interactions  Share with patient their right to decline participation, or disenroll, at any point in time     Clinical intervention and Assessment     The following interventions were made Updated medication list and requested refills    Overall assessment of the patient: Patients reports not using quetiapine and lamotrigine for the past 6 months. He also uses acetaminophen OTC as needed for pain. Clyde has no current concerns with therapy and tolerating current regimen well.     Total time spent preparing, completing assessment, and documentin minutes      Stacey Cox, Pharmacy Intern, [facility] [place] for Ble[facility] Clot[facility] Disorders [phone]    Alva López, Pharm.D, Pharmacist in Charge [facility] for Bleeding and Clotting Disorders [phone]

## 2025-06-30 NOTE — PROGRESS NOTES
[facility]  Center for [facility]ot[facility]  [facility] [address], [address], [place] [address]  Main: [phone], Fax: [phone]      Outpatient Visit Note:    Patient: Clyde Becerra  MRN: [id number]  : 2000  SLIM: 2025    History of Present Illness:  Clyde Becerra is a 24 year old male that is well known to the [facility] for his history of moderate hemophilia A (baseline factor VIII 1-2%) without inhibitor. He is treated on-demand with recombinant factor VIII (Advate). He was last seen in 2024 by my colleague Wing Pitt in the setting of a traumatic left knee hematoma (managed with Advate). His last comprehensive hemophilia clinic visit was in 2023.    Interval History:   He presents today on an urgent basis due to concern for acute right ankle hemarthrosis. He was up at his cabin this past weekend and was doing some landscaping work.  He does not remember a specific injury but was doing strenuous activity. On Saturday/into , he developed right ankle pain and swelling.  The pain was at its peak over the weekend, and though the pain has improved some, it is still painful--especially with weight bearing. Swelling is largely unchanged since onset.  He also notes paresthesias around the medial malleoli, which is new as of today. He has not technically been nonweightbearing as he has been walking, however he is placing most of his weight on the left side due to pain when he puts weight on the right. He notes that over the weekend while standing at a bar he had to stand on just his left leg and hold his right lower extremity up given the pain.    No other bleeding concerns at this time.  No other interim bleeding events since we last saw him.    Exam:  Constitutional: Appears well. Not in distress.   Respiratory: Breathing comfortably on room air.  Neuro: Aox3.  Right ankle: Moderate right ankle  swelling. ROM is restricted. There is ecchymosis around the medial malleoli and along the medial aspect of the foot. Normal pulses, sensation, and perfusion.  /77 (BP Location: Right arm)   Pulse 70   Temp 97  F (36.1  C) (Tympanic)   Wt 81.9 kg (180 lb 9.6 oz)   SpO2 98%   BMI 24.49 kg/m      Assessment:  Moderate hemophilia A (baseline factor VIII 1-2%).  Acute right ankle hemarthrosis.    Plan:  Administered 50u/kg Advate. He has crutches at home, and I recommended using these over the next several days to keep weight off of his right lower extremity. He is off work for the summer, so he will be able to rest. Advised rest, ice, compression, elevation.  We provided him with Ace bandages and Tubigrip.  He has Celebrex at home and I recommended that he take 200 mg 1-2 times daily over the next several days for pain and inflammation.  We will follow-up with him over the phone within the next couple days to check in/determine if a follow-up dose is needed.      20 minutes spent on the date of the encounter doing chart review, history and exam, documentation and further activities per the note.           Latosha Calixto PA-C, [facility]  Center for Ble[facility] Clotting Disorders  [facility] [address], [address], [place] [address]  Main: [phone], Fax: [phone]

## 2025-06-30 NOTE — PROGRESS NOTES
Rx updated in anticipation of Clyde's visit to clinic today at 330pm. He reported to our team that he currently has an ankle bleed and is in need of evaluation and infusion of factor.    Note completed to aid in [facility] pharmacy PA request.    Kaya BAUTISTAN, RN, PHN   [facility] Ble[facility] Clotting Disorders   Office: [phone]  Fax: [phone]

## 2025-06-30 NOTE — TELEPHONE ENCOUNTER
[facility]    Received a text from Clyde this morning stating he is pretty sure he has a bleed in his ankle, asking if there is appointment availabilty this afternoon.  Writer consulted with team and pt was scheduled for 330pm today with Latosha Calixto PA-C.  Pt confirmed this works with his schedule.    Nellie Kramer, [facility]  Clinical   [place] for Bleeding [facility] Clotting Disorders  Office: [phone]

## 2025-06-30 NOTE — PROGRESS NOTES
This staff met with Clyde to assist in obtaining IV access in his LAC. Advate 4000 units give slow IV push. Clyde was observed post infusion. This staff assisted in applying an ACE wrap and tubigrip to his right ankle.    He then met with pharmacy staff before discharging.     Plan made for this staff to call him tomorrow to check in on his process. He has our number to reach out prn.    Kaya BAUTISTAN, RN, PHN   [facility] Clotting Disorders   Office: [phone]  Fax: [phone]

## 2025-07-01 ENCOUNTER — TELEPHONE (OUTPATIENT)
Dept: HEMATOLOGY | Facility: CLINIC | Age: 25
End: 2025-07-01
Payer: COMMERCIAL

## 2025-07-01 NOTE — TELEPHONE ENCOUNTER
[id number]  Clyde Becerra  23 year old male  [facility] Diagnosis: moderate hemophilia A  [facility] Provider: Dr. Osei & Latosha Calixto PA-C     Call placed to patient to check in on his status post infusion of Advate 4000 units for a right ankle injury that he sustained over the weekend.    Clyde states that he tried to stay off his ankle for the remainder of yesterday and is practicing PRICE. He feels that today the swelling has decreased but the pain is the same to worse. The numbness that he reported yesterday is still present but has not increased. Clyde has not yet started taking Celebrex because the medication is at his parents house. He hopes to pick that up today.    Staff advised he get that asap and start taking. Discussed that he can take it BID for a short period of time for acute pain. Advised continued PRICE and to reach out to our center tomorrow if his s/s are not improving.     Clyde agrees to this plan and denies additional questions.    Kaya VALLE, RN, PHN   [facility] for Bleeding and Clotting Disorders   Office: [phone]  Fax: [phone]       Addendum 7/3/25    Call placed to Clyde to check in. Call went to voicemail. Left message regarding a status update on his ankle. Encouraged Clyde to call us with any questions or concerns prior to the holiday weekend. Advised he [place] his dose of Advate [place] so that it's available incase he needs to seek care.    Kaya VALLE, RN, PHN   [facility] for Bleeding and Clotting Disorders   Office: [phone]  Fax: [phone]

## (undated) RX ORDER — GLYCOPYRROLATE 0.2 MG/ML
INJECTION, SOLUTION INTRAMUSCULAR; INTRAVENOUS
Status: DISPENSED
Start: 2021-09-04

## (undated) RX ORDER — LIDOCAINE HYDROCHLORIDE 20 MG/ML
INJECTION, SOLUTION EPIDURAL; INFILTRATION; INTRACAUDAL; PERINEURAL
Status: DISPENSED
Start: 2021-09-04

## (undated) RX ORDER — PROPOFOL 10 MG/ML
INJECTION, EMULSION INTRAVENOUS
Status: DISPENSED
Start: 2021-09-04

## (undated) RX ORDER — FENTANYL CITRATE 50 UG/ML
INJECTION, SOLUTION INTRAMUSCULAR; INTRAVENOUS
Status: DISPENSED
Start: 2021-09-04